# Patient Record
Sex: FEMALE | Race: WHITE | NOT HISPANIC OR LATINO | Employment: OTHER | URBAN - METROPOLITAN AREA
[De-identification: names, ages, dates, MRNs, and addresses within clinical notes are randomized per-mention and may not be internally consistent; named-entity substitution may affect disease eponyms.]

---

## 2017-01-05 DIAGNOSIS — E78.5 HYPERLIPIDEMIA, UNSPECIFIED HYPERLIPIDEMIA TYPE: ICD-10-CM

## 2017-01-05 RX ORDER — PRAVASTATIN SODIUM 40 MG/1
40 TABLET ORAL DAILY
Qty: 90 TABLET | Refills: 1 | Status: SHIPPED | OUTPATIENT
Start: 2017-01-05 | End: 2018-04-25 | Stop reason: DRUGHIGH

## 2017-01-05 NOTE — TELEPHONE ENCOUNTER
----- Message from Jaclyn Maya sent at 1/4/2017 10:41 AM CST -----  Contact: Joselo Mancia  REFILL: pravastatin (PRAVACHOL) 40 MG tablet

## 2017-01-16 RX ORDER — CARVEDILOL 12.5 MG/1
TABLET ORAL
Qty: 60 TABLET | Refills: 2 | Status: SHIPPED | OUTPATIENT
Start: 2017-01-16 | End: 2018-04-25 | Stop reason: SDUPTHER

## 2017-03-07 DIAGNOSIS — E55.9 HYPOVITAMINOSIS D: ICD-10-CM

## 2017-03-07 RX ORDER — ERGOCALCIFEROL 1.25 MG/1
CAPSULE ORAL
Qty: 12 CAPSULE | Refills: 1 | Status: SHIPPED | OUTPATIENT
Start: 2017-03-07 | End: 2017-08-16 | Stop reason: SDUPTHER

## 2017-04-11 ENCOUNTER — OFFICE VISIT (OUTPATIENT)
Dept: FAMILY MEDICINE | Facility: CLINIC | Age: 82
End: 2017-04-11
Payer: MEDICARE

## 2017-04-11 VITALS
OXYGEN SATURATION: 95 % | SYSTOLIC BLOOD PRESSURE: 130 MMHG | HEIGHT: 57 IN | HEART RATE: 69 BPM | BODY MASS INDEX: 33.57 KG/M2 | DIASTOLIC BLOOD PRESSURE: 72 MMHG | WEIGHT: 155.63 LBS

## 2017-04-11 DIAGNOSIS — E11.9 DIET-CONTROLLED DIABETES MELLITUS: ICD-10-CM

## 2017-04-11 DIAGNOSIS — I50.32 CHRONIC DIASTOLIC HEART FAILURE: ICD-10-CM

## 2017-04-11 DIAGNOSIS — I70.1 ATHEROSCLEROSIS OF RENAL ARTERY: Primary | ICD-10-CM

## 2017-04-11 DIAGNOSIS — N95.1 MENOPAUSAL STATE: ICD-10-CM

## 2017-04-11 DIAGNOSIS — E11.9 CONTROLLED TYPE 2 DIABETES MELLITUS WITHOUT COMPLICATION, WITHOUT LONG-TERM CURRENT USE OF INSULIN: ICD-10-CM

## 2017-04-11 PROCEDURE — 1159F MED LIST DOCD IN RCRD: CPT | Mod: S$GLB,,, | Performed by: FAMILY MEDICINE

## 2017-04-11 PROCEDURE — 99214 OFFICE O/P EST MOD 30 MIN: CPT | Mod: S$GLB,,, | Performed by: FAMILY MEDICINE

## 2017-04-11 PROCEDURE — 99499 UNLISTED E&M SERVICE: CPT | Mod: S$GLB,,, | Performed by: FAMILY MEDICINE

## 2017-04-11 PROCEDURE — 1157F ADVNC CARE PLAN IN RCRD: CPT | Mod: S$GLB,,, | Performed by: FAMILY MEDICINE

## 2017-04-11 PROCEDURE — 99999 PR PBB SHADOW E&M-EST. PATIENT-LVL III: CPT | Mod: PBBFAC,,, | Performed by: FAMILY MEDICINE

## 2017-04-11 PROCEDURE — 1160F RVW MEDS BY RX/DR IN RCRD: CPT | Mod: S$GLB,,, | Performed by: FAMILY MEDICINE

## 2017-04-11 RX ORDER — LANCETS
EACH MISCELLANEOUS
Qty: 30 EACH | Refills: 11 | Status: SHIPPED | OUTPATIENT
Start: 2017-04-11 | End: 2020-10-20 | Stop reason: SDUPTHER

## 2017-04-11 RX ORDER — DEXTROSE 4 G
TABLET,CHEWABLE ORAL
Qty: 1 EACH | Refills: 0 | Status: SHIPPED | OUTPATIENT
Start: 2017-04-11

## 2017-04-11 RX ORDER — EZETIMIBE 10 MG/1
10 TABLET ORAL NIGHTLY
Refills: 3 | COMMUNITY
Start: 2017-03-27

## 2017-04-11 RX ORDER — FUROSEMIDE 20 MG/1
20 TABLET ORAL
Refills: 3 | COMMUNITY
Start: 2017-03-27 | End: 2022-12-27 | Stop reason: SDUPTHER

## 2017-04-11 NOTE — PROGRESS NOTES
"Subjective:       Patient ID: Maritza Rivas is a 89 y.o. female.    Chief Complaint: Follow Up/ Hypertension    Hypertension   This is a chronic problem. The current episode started more than 1 year ago. Associated symptoms include shortness of breath. Pertinent negatives include no chest pain, headaches or palpitations. Past treatments include calcium channel blockers, beta blockers and angiotensin blockers (130's/70's). The current treatment provides moderate improvement.   Diabetes   She presents for her follow-up diabetic visit. She has type 2 diabetes mellitus. Pertinent negatives for hypoglycemia include no headaches. Pertinent negatives for diabetes include no chest pain. An ACE inhibitor/angiotensin II receptor blocker is being taken. Eye exam current: 24th of april dr. river.     Review of Systems   Respiratory: Positive for shortness of breath. Negative for chest tightness and wheezing.         SOB with walking for long periods of time   Cardiovascular: Positive for leg swelling. Negative for chest pain and palpitations.        Swelling with ham   Neurological: Negative for syncope, light-headedness and headaches.       Objective:       Vitals:    04/11/17 1450   BP: 130/72   Pulse: 69   SpO2: 95%   Weight: 70.6 kg (155 lb 10.3 oz)   Height: 4' 8.5" (1.435 m)       Physical Exam   Constitutional: She is oriented to person, place, and time. She appears well-developed and well-nourished. No distress.   HENT:   Head: Normocephalic and atraumatic.   Eyes: Conjunctivae are normal.   Neck: Normal range of motion. Neck supple. Carotid bruit is not present.   Cardiovascular: Normal rate, regular rhythm and normal heart sounds.  Exam reveals no gallop and no friction rub.    No murmur heard.  Pulmonary/Chest: Effort normal and breath sounds normal. No respiratory distress. She has no wheezes. She has no rales.   Musculoskeletal: She exhibits no edema.   Neurological: She is alert and oriented to person, " place, and time.   Skin: She is not diaphoretic.       Assessment:       1. Atherosclerosis of renal artery    2. Controlled type 2 diabetes mellitus without complication, without long-term current use of insulin    3. Chronic diastolic heart failure    4. Menopausal state    5. Diet-controlled diabetes mellitus        Plan:       Maritza was seen today for follow up/ hypertension.    Diagnoses and all orders for this visit:    Atherosclerosis of renal artery  -     Comprehensive metabolic panel; Future    Controlled type 2 diabetes mellitus without complication, without long-term current use of insulin.   -     Comprehensive metabolic panel; Future  -     Hemoglobin A1c; Future  -     Lipid panel; Future  -     Microalbumin/creatinine urine ratio; Future  -     TSH; Future  -     CBC auto differential; Future  Ordered labs  Return in about 6 months (around 10/11/2017).    Chronic diastolic heart failure  -     Comprehensive metabolic panel; Future  -     Lipid panel; Future  -     TSH; Future  -     CBC auto differential; Future    Menopausal state  -     Vitamin D; Future  -     DXA Bone Density Spine And Hip; Future  Due for bone density.    Diet-controlled diabetes mellitus  -     blood-glucose meter (TRUE METRIX GLUCOSE METER) Misc; Use as directed  -     blood sugar diagnostic (TRUE METRIX GLUCOSE TEST STRIP) Strp; Check blood sugar DAILY  -     lancets Misc; True Metrix Lancets. CHECK SUGAR DAILY  Will send supplies to N.

## 2017-04-12 ENCOUNTER — LAB VISIT (OUTPATIENT)
Dept: LAB | Facility: HOSPITAL | Age: 82
End: 2017-04-12
Attending: FAMILY MEDICINE
Payer: MEDICARE

## 2017-04-12 DIAGNOSIS — I70.1 ATHEROSCLEROSIS OF RENAL ARTERY: ICD-10-CM

## 2017-04-12 DIAGNOSIS — I50.32 CHRONIC DIASTOLIC HEART FAILURE: ICD-10-CM

## 2017-04-12 DIAGNOSIS — N95.1 MENOPAUSAL STATE: ICD-10-CM

## 2017-04-12 DIAGNOSIS — E11.9 CONTROLLED TYPE 2 DIABETES MELLITUS WITHOUT COMPLICATION, WITHOUT LONG-TERM CURRENT USE OF INSULIN: ICD-10-CM

## 2017-04-12 LAB
ALBUMIN SERPL BCP-MCNC: 4 G/DL
ALP SERPL-CCNC: 78 U/L
ALT SERPL W/O P-5'-P-CCNC: 27 U/L
ANION GAP SERPL CALC-SCNC: 10 MMOL/L
AST SERPL-CCNC: 38 U/L
BASOPHILS # BLD AUTO: 0.05 K/UL
BASOPHILS NFR BLD: 0.9 %
BILIRUB SERPL-MCNC: 1.1 MG/DL
BUN SERPL-MCNC: 33 MG/DL
CALCIUM SERPL-MCNC: 10 MG/DL
CHLORIDE SERPL-SCNC: 106 MMOL/L
CHOLEST/HDLC SERPL: 3.4 {RATIO}
CO2 SERPL-SCNC: 23 MMOL/L
CREAT SERPL-MCNC: 1.2 MG/DL
DIFFERENTIAL METHOD: NORMAL
EOSINOPHIL # BLD AUTO: 0.3 K/UL
EOSINOPHIL NFR BLD: 4.5 %
ERYTHROCYTE [DISTWIDTH] IN BLOOD BY AUTOMATED COUNT: 13.5 %
EST. GFR  (AFRICAN AMERICAN): 46 ML/MIN/1.73 M^2
EST. GFR  (NON AFRICAN AMERICAN): 40 ML/MIN/1.73 M^2
GLUCOSE SERPL-MCNC: 98 MG/DL
HCT VFR BLD AUTO: 38.9 %
HDL/CHOLESTEROL RATIO: 29.6 %
HDLC SERPL-MCNC: 142 MG/DL
HDLC SERPL-MCNC: 42 MG/DL
HGB BLD-MCNC: 13.1 G/DL
LDLC SERPL CALC-MCNC: 67.4 MG/DL
LYMPHOCYTES # BLD AUTO: 1.7 K/UL
LYMPHOCYTES NFR BLD: 28.8 %
MCH RBC QN AUTO: 28.1 PG
MCHC RBC AUTO-ENTMCNC: 33.7 %
MCV RBC AUTO: 84 FL
MONOCYTES # BLD AUTO: 0.6 K/UL
MONOCYTES NFR BLD: 9.9 %
NEUTROPHILS # BLD AUTO: 3.2 K/UL
NEUTROPHILS NFR BLD: 55.7 %
NONHDLC SERPL-MCNC: 100 MG/DL
PLATELET # BLD AUTO: 179 K/UL
PMV BLD AUTO: 12.1 FL
POTASSIUM SERPL-SCNC: 4.9 MMOL/L
PROT SERPL-MCNC: 7.4 G/DL
RBC # BLD AUTO: 4.66 M/UL
SODIUM SERPL-SCNC: 139 MMOL/L
TRIGL SERPL-MCNC: 163 MG/DL
TSH SERPL DL<=0.005 MIU/L-ACNC: 1.85 UIU/ML
WBC # BLD AUTO: 5.77 K/UL

## 2017-04-12 PROCEDURE — 82306 VITAMIN D 25 HYDROXY: CPT

## 2017-04-12 PROCEDURE — 80053 COMPREHEN METABOLIC PANEL: CPT

## 2017-04-12 PROCEDURE — 84443 ASSAY THYROID STIM HORMONE: CPT

## 2017-04-12 PROCEDURE — 80061 LIPID PANEL: CPT

## 2017-04-12 PROCEDURE — 36415 COLL VENOUS BLD VENIPUNCTURE: CPT

## 2017-04-12 PROCEDURE — 83036 HEMOGLOBIN GLYCOSYLATED A1C: CPT

## 2017-04-12 PROCEDURE — 85025 COMPLETE CBC W/AUTO DIFF WBC: CPT

## 2017-04-13 ENCOUNTER — LAB VISIT (OUTPATIENT)
Dept: LAB | Facility: HOSPITAL | Age: 82
End: 2017-04-13
Attending: FAMILY MEDICINE
Payer: MEDICARE

## 2017-04-13 DIAGNOSIS — E11.9 TYPE 2 DIABETES MELLITUS WITHOUT COMPLICATION: ICD-10-CM

## 2017-04-13 LAB
25(OH)D3+25(OH)D2 SERPL-MCNC: 29 NG/ML
CREAT UR-MCNC: 142 MG/DL
ESTIMATED AVG GLUCOSE: 128 MG/DL
HBA1C MFR BLD HPLC: 6.1 %
MICROALBUMIN UR DL<=1MG/L-MCNC: 21 UG/ML
MICROALBUMIN/CREATININE RATIO: 14.8 UG/MG

## 2017-04-13 PROCEDURE — 82570 ASSAY OF URINE CREATININE: CPT

## 2017-04-20 ENCOUNTER — HOSPITAL ENCOUNTER (OUTPATIENT)
Dept: RADIOLOGY | Facility: HOSPITAL | Age: 82
Discharge: HOME | End: 2017-04-20
Attending: FAMILY MEDICINE
Payer: MEDICARE

## 2017-04-20 DIAGNOSIS — N95.1 MENOPAUSAL STATE: ICD-10-CM

## 2017-04-20 PROCEDURE — 77080 DXA BONE DENSITY AXIAL: CPT | Mod: 26,,, | Performed by: RADIOLOGY

## 2017-04-20 PROCEDURE — 77080 DXA BONE DENSITY AXIAL: CPT | Mod: TC

## 2017-04-27 ENCOUNTER — TELEPHONE (OUTPATIENT)
Dept: FAMILY MEDICINE | Facility: CLINIC | Age: 82
End: 2017-04-27

## 2017-05-01 ENCOUNTER — TELEPHONE (OUTPATIENT)
Dept: FAMILY MEDICINE | Facility: CLINIC | Age: 82
End: 2017-05-01

## 2017-05-01 ENCOUNTER — TELEPHONE (OUTPATIENT)
Dept: PHARMACY | Facility: CLINIC | Age: 82
End: 2017-05-01

## 2017-05-03 ENCOUNTER — TELEPHONE (OUTPATIENT)
Dept: FAMILY MEDICINE | Facility: CLINIC | Age: 82
End: 2017-05-03

## 2017-05-03 NOTE — TELEPHONE ENCOUNTER
----- Message from Teresa Gillette sent at 5/2/2017  2:50 PM CDT -----  Contact:  pharmacy [facility] is requesting a call from office in regards to pt [phone]            Thanks

## 2017-05-15 ENCOUNTER — OFFICE VISIT (OUTPATIENT)
Dept: FAMILY MEDICINE | Facility: CLINIC | Age: 82
End: 2017-05-15
Payer: MEDICARE

## 2017-05-15 VITALS
SYSTOLIC BLOOD PRESSURE: 120 MMHG | TEMPERATURE: 100 F | HEART RATE: 76 BPM | HEIGHT: 57 IN | BODY MASS INDEX: 33.82 KG/M2 | WEIGHT: 156.75 LBS | OXYGEN SATURATION: 90 % | DIASTOLIC BLOOD PRESSURE: 58 MMHG

## 2017-05-15 DIAGNOSIS — J20.9 ACUTE BRONCHITIS, UNSPECIFIED ORGANISM: Primary | ICD-10-CM

## 2017-05-15 PROCEDURE — 99213 OFFICE O/P EST LOW 20 MIN: CPT | Mod: S$GLB,,, | Performed by: INTERNAL MEDICINE

## 2017-05-15 PROCEDURE — 99499 UNLISTED E&M SERVICE: CPT | Mod: S$GLB,,, | Performed by: INTERNAL MEDICINE

## 2017-05-15 PROCEDURE — 1160F RVW MEDS BY RX/DR IN RCRD: CPT | Mod: S$GLB,,, | Performed by: INTERNAL MEDICINE

## 2017-05-15 PROCEDURE — 99999 PR PBB SHADOW E&M-EST. PATIENT-LVL III: CPT | Mod: PBBFAC,,, | Performed by: INTERNAL MEDICINE

## 2017-05-15 PROCEDURE — 1159F MED LIST DOCD IN RCRD: CPT | Mod: S$GLB,,, | Performed by: INTERNAL MEDICINE

## 2017-05-15 PROCEDURE — 1125F AMNT PAIN NOTED PAIN PRSNT: CPT | Mod: S$GLB,,, | Performed by: INTERNAL MEDICINE

## 2017-05-15 NOTE — MR AVS SNAPSHOT
[facility]  [address]  [address]  [place] [address]  Phone: [phone]  Fax: [phone]                  Maritza Rivas   5/15/2017 1:40 PM   Office Visit    Description:  Female : 1926   Provider:  Laura Farnsworth MD   Department:  [facility]           Reason for Visit     Chest Congestion     Cough     Fever     Shortness of Breath           Diagnoses this Visit        Comments    Acute bronchitis, unspecified organism    -  Primary            To Do List           Goals (5 Years of Data)     None      [facility]     [facility] On Call Nurse Care Line -  Assistance  Unless otherwise directed by your provider, please contact [facility], our nurse care line that is available for  assistance.     Registered nurses in the [facility] On Call Center provide: appointment scheduling, clinical advisement, health education, and other advisory services.  Call: [phone] (toll free)               Medications           Message regarding Medications     Verify the changes and/or additions to your medication regime listed below are the same as discussed with your clinician today.  If any of these changes or additions are incorrect, please notify your healthcare provider.             Verify that the below list of medications is an accurate representation of the medications you are currently taking.  If none reported, the list may be blank. If incorrect, please contact your healthcare provider. Carry this list with you in case of emergency.           Current Medications     amlodipine (NORVASC) 10 MG tablet TAKE 1 TABLET (10 MG TOTAL) BY MOUTH ONCE DAILY.    aspirin (ECOTRIN) 81 MG EC tablet Take 81 mg by mouth nightly.     blood sugar diagnostic (TRUE METRIX GLUCOSE TEST STRIP) Strp Check blood sugar DAILY    blood-glucose meter (TRUE METRIX GLUCOSE METER) Misc Use as directed    carvedilol (COREG) 12.5 MG tablet TAKE 1 TABLET (12.5 MG TOTAL) BY  "MOUTH 2 (TWO) TIMES DAILY WITH MEALS.    denosumab (PROLIA) 60 mg/mL Syrg Inject 1 mL (60 mg total) into the skin every 6 (six) months.    docusate sodium (COLACE) 50 MG capsule Take by mouth every evening.     ergocalciferol (VITAMIN D2) 50,000 unit Cap Take 50,000 Units by mouth every 7 days.    ezetimibe (ZETIA) 10 mg tablet Take 10 mg by mouth every evening.    folic acid-vit B6-vit B12 2.5-25-2 mg (FOLBIC) 2.5-25-2 mg Tab Take 1 tablet by mouth once daily.    furosemide (LASIX) 20 MG tablet Take 20 mg by mouth twice a week.    lancets Misc True Metrix Lancets. CHECK SUGAR DAILY    levothyroxine (SYNTHROID) 75 MCG tablet TAKE 1 TABLET BY MOUTH BEFORE BREAKFAST.    pravastatin (PRAVACHOL) 40 MG tablet Take 1 tablet (40 mg total) by mouth once daily.    valsartan (DIOVAN) 40 MG tablet Take 1 tablet (40 mg total) by mouth once daily.    VITAMIN D2 50,000 unit capsule TAKE 1 CAPSULE BY MOUTH ONCE A WEEK           Clinical Reference Information           Your Vitals Were     BP Pulse Temp Height Weight SpO2    120/58 76 99.6 °F (37.6 °C) (Oral) 4' 8.5" (1.435 m) 71.1 kg (156 lb 12 oz) 90%    BMI                34.52 kg/m2          Blood Pressure          Most Recent Value    BP  (!)  120/58      Allergies as of 5/15/2017     Simvastatin    Phenergan [Promethazine]      Immunizations Administered on Date of Encounter - 5/15/2017     None      [facility] Sign-Up     Activating your [facility] account is as easy as 1-2-3!     1) Visit [email or web address], select Sign Up Now, enter this activation code and your date of birth, then select Next.  [email or web address]  Expires: 6/29/2017  2:28 PM      2) Create a username and password to use when you visit [facility] in the future and select a security question in case you lose your password and select Next.    3) Enter your e-mail address and click Sign Up!    Additional Information  If you have questions, please e-mail [email or web address] or call [phone] to talk to our " [place] staff. Remember, [facility] is NOT to be used for urgent needs. For medical emergencies, dial 911.         Instructions      What Is Acute Bronchitis?  Acute or short-term bronchitis last for days or weeks. It occurs when the bronchial tubes (airways in the lungs) are irritated by a virus, bacteria, or allergen. This causes a cough that produces yellow or greenish mucus.  Inside healthy lungs    Air travels in and out of the lungs through the airways. The linings of these airways produce sticky mucus. This mucus traps particles that enter the lungs. Tiny structures called cilia then sweep the particles out of the airways.     Healthy airway: Airways are normally open. Air moves in and out easily.      Healthy cilia: Tiny, hairlike cilia sweep mucus and particles up and out of the airways.   Lungs with bronchitis  Bronchitis often occurs with a cold or the flu virus. The airways become inflamed (red and swollen). There is a deep hacking cough from the extra mucus. Other symptoms may include:  · Wheezing  · Chest discomfort  · Shortness of breath  · Mild fever  A second infection, this time due to bacteria, may then occur. And airways irritated by allergens or smoke are more likely to get infected.        Inflamed airway: Inflammation and extra mucus narrow the airway, causing shortness of breath.      Impaired cilia: Extra mucus impairs cilia, causing congestion and wheezing. Smoking makes the problem worse.   Making a diagnosis  A physical exam, health history, and certain tests help your healthcare provider make the diagnosis.  Health history  Your healthcare provider will ask you about your symptoms.  The exam  Your provider listens to your chest for signs of congestion. He or she may also check your ears, nose, and throat.  Possible tests  · A sputum test for bacteria. This requires a sample of mucus from the lungs.  · A nasal or throat swab for bacterial infection.  · A chest X-ray if your healthcare  provider thinks you have pneumonia.  · Tests to check for an underlying condition, such as allergies, asthma, or COPD. You may need to see a specialist for more lung function testing.  Treating a cough  The main treatment for bronchitis is easing symptoms. Avoiding smoke, allergens, and other things that trigger coughing can often help. If the infection is bacterial, you may be given antibiotics. During the illness, it's important to get plenty of sleep. To ease symptoms:  · Dont smoke, and avoid secondhand smoke.  · Use a humidifier, or breathe in steam from a hot shower. This may help loosen mucus.  · Drink a lot of water and juice. They can soothe the throat and may help thin mucus.  · Sit up or use extra pillows when in bed to help lessen coughing and congestion.  · Ask your provider about using cough medicine, pain and fever medicine, or a decongestant.  Antibiotics  Most cases of bronchitis are caused by cold or flu viruses. Antibiotics dont treat viral illness. Taking antibiotics when they are not needed increases your risk of getting an infection later that is antibiotic-resistant. Your provider will prescribe antibiotics if the infection is caused by bacteria. If they are prescribed:  · Take the medicine until it is used up, even if symptoms have improved. If you dont, the bronchitis may come back.  · Take them as directed. For instance, some medicines should be taken with food.  · Ask your provider or pharmacist what side effects are common, and what to do about them.  Follow-up care  You should see your provider again in 2 to 3 weeks. By this time, symptoms should have improved. An infection that lasts longer may mean you have a more serious problem.  Prevention  · Avoid tobacco smoke. If you smoke, quit. Stay away from smoky places. Ask friends and family not to smoke around you, or in your home or car.  · Get checked for allergies.  · Ask your provider about getting a yearly flu shot, and  pneumococcal or pneumonia shots.  · Wash your hands often. This helps reduce the chance of picking up viruses that cause colds and flu.  Call your healthcare provider if:  · Symptoms worsen, or new symptoms develop.  · Breathing problems worsen or  become severe.  · Symptoms dont get better within a week, or within 3 days of taking antibiotics.   Date Last Reviewed: 6/18/2014  © 6424-1089 [employer]. [address] [place] [address], [place] [address]. All rights reserved. This information is not intended as a substitute for professional medical care. Always follow your healthcare professional's instructions.             Language Assistance Services     ATTENTION: Language assistance services are available, free of charge. Please call [phone].      ATENCIÓN: Si anla pravin, tiene a pimentel disposición servicios gratuitos de asistencia lingüística. Llame al [phone].     CHÚ Ý: N?u b?n nói Ti?ng Vi?t, có các d?ch v? h? tr? ngôn ng? mi?n phí dành cho b?n. G?i s? [phone].         [facility] complies with applicable Federal civil rights laws and does not discriminate on the basis of race, color, national origin, age, disability, or sex.

## 2017-05-15 NOTE — PATIENT INSTRUCTIONS
What Is Acute Bronchitis?  Acute or short-term bronchitis last for days or weeks. It occurs when the bronchial tubes (airways in the lungs) are irritated by a virus, bacteria, or allergen. This causes a cough that produces yellow or greenish mucus.  Inside healthy lungs    Air travels in and out of the lungs through the airways. The linings of these airways produce sticky mucus. This mucus traps particles that enter the lungs. Tiny structures called cilia then sweep the particles out of the airways.     Healthy airway: Airways are normally open. Air moves in and out easily.      Healthy cilia: Tiny, hairlike cilia sweep mucus and particles up and out of the airways.   Lungs with bronchitis  Bronchitis often occurs with a cold or the flu virus. The airways become inflamed (red and swollen). There is a deep hacking cough from the extra mucus. Other symptoms may include:  · Wheezing  · Chest discomfort  · Shortness of breath  · Mild fever  A second infection, this time due to bacteria, may then occur. And airways irritated by allergens or smoke are more likely to get infected.        Inflamed airway: Inflammation and extra mucus narrow the airway, causing shortness of breath.      Impaired cilia: Extra mucus impairs cilia, causing congestion and wheezing. Smoking makes the problem worse.   Making a diagnosis  A physical exam, health history, and certain tests help your healthcare provider make the diagnosis.  Health history  Your healthcare provider will ask you about your symptoms.  The exam  Your provider listens to your chest for signs of congestion. He or she may also check your ears, nose, and throat.  Possible tests  · A sputum test for bacteria. This requires a sample of mucus from the lungs.  · A nasal or throat swab for bacterial infection.  · A chest X-ray if your healthcare provider thinks you have pneumonia.  · Tests to check for an underlying condition, such as allergies, asthma, or COPD. You may need  to see a specialist for more lung function testing.  Treating a cough  The main treatment for bronchitis is easing symptoms. Avoiding smoke, allergens, and other things that trigger coughing can often help. If the infection is bacterial, you may be given antibiotics. During the illness, it's important to get plenty of sleep. To ease symptoms:  · Dont smoke, and avoid secondhand smoke.  · Use a humidifier, or breathe in steam from a hot shower. This may help loosen mucus.  · Drink a lot of water and juice. They can soothe the throat and may help thin mucus.  · Sit up or use extra pillows when in bed to help lessen coughing and congestion.  · Ask your provider about using cough medicine, pain and fever medicine, or a decongestant.  Antibiotics  Most cases of bronchitis are caused by cold or flu viruses. Antibiotics dont treat viral illness. Taking antibiotics when they are not needed increases your risk of getting an infection later that is antibiotic-resistant. Your provider will prescribe antibiotics if the infection is caused by bacteria. If they are prescribed:  · Take the medicine until it is used up, even if symptoms have improved. If you dont, the bronchitis may come back.  · Take them as directed. For instance, some medicines should be taken with food.  · Ask your provider or pharmacist what side effects are common, and what to do about them.  Follow-up care  You should see your provider again in 2 to 3 weeks. By this time, symptoms should have improved. An infection that lasts longer may mean you have a more serious problem.  Prevention  · Avoid tobacco smoke. If you smoke, quit. Stay away from smoky places. Ask friends and family not to smoke around you, or in your home or car.  · Get checked for allergies.  · Ask your provider about getting a yearly flu shot, and pneumococcal or pneumonia shots.  · Wash your hands often. This helps reduce the chance of picking up viruses that cause colds and flu.  Call  your healthcare provider if:  · Symptoms worsen, or new symptoms develop.  · Breathing problems worsen or  become severe.  · Symptoms dont get better within a week, or within 3 days of taking antibiotics.   Date Last Reviewed: 6/18/2014  © 2512-0810 [employer]. [address] [place] [address], [place] [address]. All rights reserved. This information is not intended as a substitute for professional medical care. Always follow your healthcare professional's instructions.

## 2017-05-15 NOTE — PROGRESS NOTES
SUBJECTIVE     Chief Complaint   Patient presents with    Chest Congestion     all sx x 6 days. OTC: Tylenol    Cough    Fever    Shortness of Breath       HPI  Maritza Rivas is a 89 y.o. female with multiple medical diagnoses as listed in the medical history and problem list that presents for evaluation of URI x 6 days. Pt complaints of a productive cough, fever up to 102.4 last night(at ~6pm), and SOB. Pt took Tylenol with resolution of fever. +sick contacts(great-granddaughter and granddaughter with URI). Denies any recent travel.    PAST MEDICAL HISTORY:  Past Medical History:   Diagnosis Date    Aortic stenosis     Aortic valve stenosis, critical     Carotid bruit     Carpal tunnel syndrome, bilateral     Coronary artery disease     Diabetes mellitus type II     H/O aortic valve replacement     Hypertension     Kidney insufficiency     PCP stated Stage 3    Pure hyperglyceridemia     Thyroid disease     hypothyroidism       PAST SURGICAL HISTORY:  Past Surgical History:   Procedure Laterality Date    ADENOIDECTOMY      AORTIC VALVE REPLACEMENT  2013    CARDIAC SURGERY  2013    3 VESSEL bypass    CATARACT EXTRACTION BILATERAL W/ ANTERIOR VITRECTOMY      TONSILLECTOMY  1946    VAGINAL DELIVERY      x9 normal       SOCIAL HISTORY:  Social History     Social History    Marital status:      Spouse name: N/A    Number of children: N/A    Years of education: N/A     Occupational History    Not on file.     Social History Main Topics    Smoking status: Never Smoker    Smokeless tobacco: Not on file    Alcohol use No    Drug use: No    Sexual activity: No     Other Topics Concern    Not on file     Social History Narrative       FAMILY HISTORY:  Family History   Problem Relation Age of Onset    Stroke Mother     Diabetes Mother     Heart disease Father        ALLERGIES AND MEDICATIONS: updated and reviewed.  Review of patient's allergies indicates:   Allergen Reactions     Simvastatin      Leg cramps    Phenergan [promethazine] Other (See Comments)     confusion     Current Outpatient Prescriptions   Medication Sig Dispense Refill    amlodipine (NORVASC) 10 MG tablet TAKE 1 TABLET (10 MG TOTAL) BY MOUTH ONCE DAILY. 30 tablet 5    aspirin (ECOTRIN) 81 MG EC tablet Take 81 mg by mouth nightly.       blood sugar diagnostic (TRUE METRIX GLUCOSE TEST STRIP) Strp Check blood sugar DAILY 30 each 11    blood-glucose meter (TRUE METRIX GLUCOSE METER) Misc Use as directed 1 each 0    carvedilol (COREG) 12.5 MG tablet TAKE 1 TABLET (12.5 MG TOTAL) BY MOUTH 2 (TWO) TIMES DAILY WITH MEALS. 60 tablet 2    denosumab (PROLIA) 60 mg/mL Syrg Inject 1 mL (60 mg total) into the skin every 6 (six) months. 1 mL 1    docusate sodium (COLACE) 50 MG capsule Take by mouth every evening.       ergocalciferol (VITAMIN D2) 50,000 unit Cap Take 50,000 Units by mouth every 7 days.      ezetimibe (ZETIA) 10 mg tablet Take 10 mg by mouth every evening.  3    folic acid-vit B6-vit B12 2.5-25-2 mg (FOLBIC) 2.5-25-2 mg Tab Take 1 tablet by mouth once daily. 30 tablet 11    furosemide (LASIX) 20 MG tablet Take 20 mg by mouth twice a week.  3    lancets Misc True Metrix Lancets. CHECK SUGAR DAILY 30 each 11    levothyroxine (SYNTHROID) 75 MCG tablet TAKE 1 TABLET BY MOUTH BEFORE BREAKFAST. 30 tablet 9    pravastatin (PRAVACHOL) 40 MG tablet Take 1 tablet (40 mg total) by mouth once daily. 90 tablet 1    valsartan (DIOVAN) 40 MG tablet Take 1 tablet (40 mg total) by mouth once daily. 90 tablet 1    VITAMIN D2 50,000 unit capsule TAKE 1 CAPSULE BY MOUTH ONCE A WEEK 12 capsule 1    [DISCONTINUED] metoprolol succinate (TOPROL XL) 50 MG 24 hr tablet Take 1 tablet (50 mg total) by mouth once daily. 30 tablet 11     No current facility-administered medications for this visit.        ROS  Review of Systems   Constitutional: Positive for fever. Negative for chills.   HENT: Negative for hearing loss and sore  "throat.    Eyes: Negative for visual disturbance.   Respiratory: Positive for cough and shortness of breath.    Cardiovascular: Negative for chest pain, palpitations and leg swelling.   Gastrointestinal: Positive for constipation. Negative for abdominal pain, diarrhea, nausea and vomiting.   Genitourinary: Negative for dysuria, frequency and urgency.   Musculoskeletal: Negative for arthralgias, joint swelling and myalgias.   Skin: Positive for rash (pelvic area). Negative for wound.   Neurological: Negative for headaches.   Psychiatric/Behavioral: Negative for agitation and confusion. The patient is not nervous/anxious.          OBJECTIVE     Physical Exam  Vitals:    05/15/17 1349   BP: (!) 120/58   Pulse: 76   Temp: 99.6 °F (37.6 °C)    Body mass index is 34.52 kg/(m^2).  Weight: 71.1 kg (156 lb 12 oz)   Height: 4' 8.5" (143.5 cm)     Physical Exam   Constitutional: She is oriented to person, place, and time. She appears well-developed and well-nourished. No distress.   HENT:   Head: Normocephalic and atraumatic.   Right Ear: External ear normal.   Left Ear: External ear normal.   Nose: Nose normal.   Mouth/Throat: Oropharynx is clear and moist.   Eyes: Conjunctivae and EOM are normal. Right eye exhibits no discharge. Left eye exhibits no discharge. No scleral icterus.   Neck: Normal range of motion. Neck supple. No JVD present. No tracheal deviation present.   Cardiovascular: Normal rate, regular rhythm and intact distal pulses.  Exam reveals no gallop and no friction rub.    Murmur (holosystolic with radiation to the B/L clavicles) heard.  Pulmonary/Chest: Effort normal and breath sounds normal. No respiratory distress. She has no wheezes.   Abdominal: Soft. Bowel sounds are normal. She exhibits no distension and no mass. There is no tenderness. There is no rebound and no guarding.   Musculoskeletal: Normal range of motion. She exhibits no edema, tenderness or deformity.   Neurological: She is alert and oriented " to person, place, and time. She exhibits normal muscle tone. Coordination normal.   Skin: Skin is warm and dry. No rash noted. No erythema.   Psychiatric: She has a normal mood and affect. Her behavior is normal. Judgment and thought content normal.         Health Maintenance       Date Due Completion Date    Zoster Vaccine 8/1/1986 ---    Eye Exam 4/2/2016 4/2/2015 (Done)    Override on 4/2/2015: Done    Override on 4/22/2014: Done (appointment scheduled)    Influenza Vaccine 8/1/2017 10/25/2016    Override on 10/14/2015: Done    Foot Exam 9/6/2017 9/6/2016 (Done)    Override on 9/6/2016: Done    Override on 10/9/2015: Done (Dr. Richey [facility])    Hemoglobin A1c 10/12/2017 4/12/2017    Pneumococcal (65+) (2 of 2 - PPSV23) 10/24/2017 10/24/2016    Lipid Panel 4/12/2018 4/12/2017    Urine Microalbumin 4/13/2018 4/13/2017    DEXA SCAN 4/20/2020 4/20/2017    TETANUS VACCINE 6/19/2024 6/19/2014            ASSESSMENT     89 y.o. female with     1. Acute bronchitis, unspecified organism        PLAN:     1. Acute bronchitis, unspecified organism  - Likely 2/2 an acute viral illness  - Pt advised to rest and remain well hydrated  - Pt to take OTC cough meds(Delsym) prn cough  - Pt refused a Rx for Tessalon Perles and told me that she will not take any Guaifenesin        RTC in 1-2 weeks for any acute worsening of current condition     Laura Farnsworth MD  05/15/2017 2:15 PM        No Follow-up on file.

## 2017-05-25 ENCOUNTER — OFFICE VISIT (OUTPATIENT)
Dept: FAMILY MEDICINE | Facility: CLINIC | Age: 82
End: 2017-05-25
Payer: MEDICARE

## 2017-05-25 VITALS
SYSTOLIC BLOOD PRESSURE: 130 MMHG | WEIGHT: 151.25 LBS | TEMPERATURE: 98 F | HEART RATE: 70 BPM | OXYGEN SATURATION: 94 % | DIASTOLIC BLOOD PRESSURE: 64 MMHG | HEIGHT: 57 IN | BODY MASS INDEX: 32.63 KG/M2

## 2017-05-25 DIAGNOSIS — E55.9 VITAMIN D DEFICIENCY: ICD-10-CM

## 2017-05-25 DIAGNOSIS — E11.22 CONTROLLED TYPE 2 DIABETES MELLITUS WITH STAGE 3 CHRONIC KIDNEY DISEASE, WITHOUT LONG-TERM CURRENT USE OF INSULIN: Primary | ICD-10-CM

## 2017-05-25 DIAGNOSIS — N18.30 CKD (CHRONIC KIDNEY DISEASE) STAGE 3, GFR 30-59 ML/MIN: ICD-10-CM

## 2017-05-25 DIAGNOSIS — N18.30 CONTROLLED TYPE 2 DIABETES MELLITUS WITH STAGE 3 CHRONIC KIDNEY DISEASE, WITHOUT LONG-TERM CURRENT USE OF INSULIN: Primary | ICD-10-CM

## 2017-05-25 DIAGNOSIS — J18.9 ATYPICAL PNEUMONIA: ICD-10-CM

## 2017-05-25 PROCEDURE — 99499 UNLISTED E&M SERVICE: CPT | Mod: S$GLB,,, | Performed by: FAMILY MEDICINE

## 2017-05-25 PROCEDURE — 99999 PR PBB SHADOW E&M-EST. PATIENT-LVL III: CPT | Mod: PBBFAC,,, | Performed by: FAMILY MEDICINE

## 2017-05-25 PROCEDURE — 1159F MED LIST DOCD IN RCRD: CPT | Mod: S$GLB,,, | Performed by: FAMILY MEDICINE

## 2017-05-25 PROCEDURE — 99214 OFFICE O/P EST MOD 30 MIN: CPT | Mod: S$GLB,,, | Performed by: FAMILY MEDICINE

## 2017-05-25 RX ORDER — BENZONATATE 100 MG/1
100 CAPSULE ORAL 3 TIMES DAILY PRN
Qty: 30 CAPSULE | Refills: 0 | Status: SHIPPED | OUTPATIENT
Start: 2017-05-25 | End: 2017-06-04

## 2017-05-25 RX ORDER — CEPHALEXIN 500 MG/1
500 CAPSULE ORAL EVERY 12 HOURS
Qty: 14 CAPSULE | Refills: 0 | Status: SHIPPED | OUTPATIENT
Start: 2017-05-25 | End: 2017-06-01

## 2017-05-25 NOTE — PROGRESS NOTES
"Subjective:       Patient ID: Maritza Rivas is a 89 y.o. female.    Chief Complaint: No chief complaint on file.    Patient with diabetes, hypertension, CAD, hypothyroidism presents with cough x 2 weeks. She states it was productive, but it has improved. She initially had fever, but this has resolved. She also gets a little short of breath as well.     She had her eyes checked in April with Dr. Rainey for her diabetes check up.       Review of Systems   Constitutional: Positive for fever. Negative for chills, diaphoresis and fatigue.   HENT: Positive for sore throat. Negative for postnasal drip, rhinorrhea and sneezing.    Respiratory: Positive for cough and shortness of breath. Negative for chest tightness and wheezing.    Cardiovascular: Negative for chest pain, palpitations and leg swelling.   Gastrointestinal: Negative for abdominal pain, diarrhea, nausea and vomiting.   Genitourinary: Negative for dysuria and hematuria.   Neurological: Negative for dizziness, syncope, light-headedness and headaches.       Objective:       Vitals:    05/25/17 1149   BP: 130/64   Pulse: 70   Temp: 98.4 °F (36.9 °C)   TempSrc: Oral   SpO2: (!) 94%   Weight: 68.6 kg (151 lb 3.8 oz)   Height: 4' 8.5" (1.435 m)     Physical Exam   Constitutional: She is oriented to person, place, and time. She appears well-developed and well-nourished. No distress.   HENT:   Head: Normocephalic and atraumatic.   Right Ear: External ear normal.   Left Ear: External ear normal.   Mouth/Throat: Oropharynx is clear and moist. No oropharyngeal exudate.   Eyes: Conjunctivae are normal.   Neck: Normal range of motion. Neck supple. Carotid bruit is not present.   Cardiovascular: Normal rate, regular rhythm and normal heart sounds.  Exam reveals no gallop and no friction rub.    No murmur heard.  Pulmonary/Chest: Effort normal. No respiratory distress. She has no wheezes. She has rales.   Musculoskeletal: She exhibits no edema.   Neurological: She is " alert and oriented to person, place, and time.   Skin: She is not diaphoretic.       Assessment:       1. Controlled type 2 diabetes mellitus with stage 3 chronic kidney disease, without long-term current use of insulin    2. Vitamin D deficiency    3. Atypical pneumonia    4. CKD (chronic kidney disease) stage 3, GFR 30-59 ml/min        Plan:       Diagnoses and all orders for this visit:    Controlled type 2 diabetes mellitus with stage 3 chronic kidney disease, without long-term current use of insulin  Her diabetes is controlled. A1C in April was 6.1. Due for lab recheck in October 2017. Will recheck kidney functioning with vitamin D in July 2017.     Vitamin D deficiency  On replacement. Will recheck vitamin D in July 2017.   Atypical pneumonia  -     cephALEXin (KEFLEX) 500 MG capsule; Take 1 capsule (500 mg total) by mouth every 12 (twelve) hours.  -     benzonatate (TESSALON) 100 MG capsule; Take 1 capsule (100 mg total) by mouth 3 (three) times daily as needed for Cough.  Chose keflex due to ckd

## 2017-05-31 NOTE — TELEPHONE ENCOUNTER
----- Message from Sowmya Beard sent at 5/31/2017 10:16 AM CDT -----  Refill: folic acid-vit B6-vit B12 2.5-25-2 mg (FOLBIC) 2.5-25-2 mg Tab    Please send to [facility]. Thank you!

## 2017-08-16 ENCOUNTER — OFFICE VISIT (OUTPATIENT)
Dept: FAMILY MEDICINE | Facility: CLINIC | Age: 82
End: 2017-08-16
Payer: MEDICARE

## 2017-08-16 VITALS
SYSTOLIC BLOOD PRESSURE: 110 MMHG | WEIGHT: 149.25 LBS | DIASTOLIC BLOOD PRESSURE: 70 MMHG | HEART RATE: 77 BPM | HEIGHT: 57 IN | TEMPERATURE: 99 F | OXYGEN SATURATION: 95 % | BODY MASS INDEX: 32.2 KG/M2

## 2017-08-16 DIAGNOSIS — M54.41 CHRONIC RIGHT-SIDED LOW BACK PAIN WITH RIGHT-SIDED SCIATICA: Primary | ICD-10-CM

## 2017-08-16 DIAGNOSIS — G89.29 CHRONIC RIGHT-SIDED LOW BACK PAIN WITH RIGHT-SIDED SCIATICA: Primary | ICD-10-CM

## 2017-08-16 DIAGNOSIS — S46.312A STRAIN OF LEFT TRICEPS, INITIAL ENCOUNTER: ICD-10-CM

## 2017-08-16 PROCEDURE — 99999 PR PBB SHADOW E&M-EST. PATIENT-LVL III: CPT | Mod: PBBFAC,,, | Performed by: FAMILY MEDICINE

## 2017-08-16 PROCEDURE — 1125F AMNT PAIN NOTED PAIN PRSNT: CPT | Mod: S$GLB,,, | Performed by: FAMILY MEDICINE

## 2017-08-16 PROCEDURE — 3008F BODY MASS INDEX DOCD: CPT | Mod: S$GLB,,, | Performed by: FAMILY MEDICINE

## 2017-08-16 PROCEDURE — 1159F MED LIST DOCD IN RCRD: CPT | Mod: S$GLB,,, | Performed by: FAMILY MEDICINE

## 2017-08-16 PROCEDURE — 99499 UNLISTED E&M SERVICE: CPT | Mod: S$GLB,,, | Performed by: FAMILY MEDICINE

## 2017-08-16 PROCEDURE — 99214 OFFICE O/P EST MOD 30 MIN: CPT | Mod: S$GLB,,, | Performed by: FAMILY MEDICINE

## 2017-08-16 RX ORDER — EZETIMIBE 10 MG/1
TABLET ORAL
Refills: 3 | COMMUNITY
Start: 2017-06-30 | End: 2017-08-16 | Stop reason: SDUPTHER

## 2017-08-16 RX ORDER — DICLOFENAC SODIUM 10 MG/G
2 GEL TOPICAL 2 TIMES DAILY
Qty: 100 G | Refills: 5 | Status: SHIPPED | OUTPATIENT
Start: 2017-08-16 | End: 2018-04-25 | Stop reason: SDDI

## 2017-08-16 NOTE — PROGRESS NOTES
"Subjective:       Patient ID: Maritza Rivas is a 89 y.o. female.    Chief Complaint: Right Hip Pain/ burning sensation and Left Arm Pain/ Weakness    Subjective:    Maritza Rivas is a 89 y.o. female who presents with right hip pain. Onset of the symptoms was 5 months ago. Inciting event: none. The patient reports the hip pain is aggravated by walking and is worse with standing for long periods of time. Aggravating symptoms include: any weight bearing, rising after sitting and standing. Patient has had no prior hip problems. Previous visits for this problem: none. Evaluation to date: none. Treatment to date: aspercreme on this.  Subjective:    Maritza Rivas is a 89 y.o. female who presents with left shoulder pain. The symptoms began 1 year ago. Aggravating factors: no known event. Pain is located diffusely throughout the shoulder. Discomfort is described as aching and has a straining senstaion. Symptoms are exacerbated by overhead movements. Evaluation to date: none. Therapy to date includes: nothing specific.        Review of Systems    Objective:       Vitals:    08/16/17 1039   BP: 110/70   Pulse: 77   Temp: 98.8 °F (37.1 °C)   TempSrc: Oral   SpO2: 95%   Weight: 67.7 kg (149 lb 4 oz)   Height: 4' 8.5" (1.435 m)       Physical Exam   Constitutional: She appears well-developed and well-nourished. No distress.   Musculoskeletal:        Left shoulder: She exhibits tenderness and pain. She exhibits normal range of motion, no bony tenderness, no swelling, no crepitus, no deformity, no laceration and normal strength.        Right hip: She exhibits normal range of motion, normal strength, no tenderness, no bony tenderness, no crepitus and no deformity.        Lumbar back: She exhibits tenderness and pain. She exhibits normal range of motion, no edema, no deformity, no laceration and no spasm.        Back:         Arms:  Skin: She is not diaphoretic.       Assessment:       1. Chronic right-sided low back pain " with right-sided sciatica    2. Strain of left triceps, initial encounter        Plan:       Maritza was seen today for right hip pain/ burning sensation and left arm pain/ weakness.    Diagnoses and all orders for this visit:    Chronic right-sided low back pain with right-sided sciatica  -     diclofenac sodium (VOLTAREN) 1 % Gel; Apply 2 g topically 2 (two) times daily.    Strain of left triceps, initial encounter  -     diclofenac sodium (VOLTAREN) 1 % Gel; Apply 2 g topically 2 (two) times daily.    TOPICAL VOLTAREN AND SHE IS ON ASPIRIN  AND HAS KIDNEY DISEASE.

## 2017-08-22 DIAGNOSIS — E55.9 HYPOVITAMINOSIS D: ICD-10-CM

## 2017-08-22 RX ORDER — ERGOCALCIFEROL 1.25 MG/1
CAPSULE ORAL
Qty: 12 CAPSULE | Refills: 1 | Status: SHIPPED | OUTPATIENT
Start: 2017-08-22 | End: 2017-12-18 | Stop reason: SDUPTHER

## 2017-10-18 ENCOUNTER — CLINICAL SUPPORT (OUTPATIENT)
Dept: FAMILY MEDICINE | Facility: CLINIC | Age: 82
End: 2017-10-18
Payer: MEDICARE

## 2017-10-18 DIAGNOSIS — Z23 NEED FOR PROPHYLACTIC VACCINATION AND INOCULATION AGAINST INFLUENZA: Primary | ICD-10-CM

## 2017-10-18 PROCEDURE — 90662 IIV NO PRSV INCREASED AG IM: CPT | Mod: S$GLB,,, | Performed by: FAMILY MEDICINE

## 2017-10-18 PROCEDURE — G0008 ADMIN INFLUENZA VIRUS VAC: HCPCS | Mod: S$GLB,,, | Performed by: FAMILY MEDICINE

## 2017-12-18 DIAGNOSIS — E55.9 HYPOVITAMINOSIS D: ICD-10-CM

## 2017-12-18 RX ORDER — ERGOCALCIFEROL 1.25 MG/1
CAPSULE ORAL
Qty: 12 CAPSULE | Refills: 1 | Status: SHIPPED | OUTPATIENT
Start: 2017-12-18 | End: 2018-04-25 | Stop reason: SDUPTHER

## 2018-04-25 ENCOUNTER — OFFICE VISIT (OUTPATIENT)
Dept: FAMILY MEDICINE | Facility: CLINIC | Age: 83
End: 2018-04-25
Payer: MEDICARE

## 2018-04-25 VITALS
RESPIRATION RATE: 16 BRPM | DIASTOLIC BLOOD PRESSURE: 70 MMHG | BODY MASS INDEX: 32.2 KG/M2 | OXYGEN SATURATION: 94 % | SYSTOLIC BLOOD PRESSURE: 114 MMHG | HEART RATE: 68 BPM | HEIGHT: 57 IN | TEMPERATURE: 98 F | WEIGHT: 149.25 LBS

## 2018-04-25 DIAGNOSIS — E03.9 HYPOTHYROIDISM, UNSPECIFIED TYPE: ICD-10-CM

## 2018-04-25 DIAGNOSIS — E11.8 DM TYPE 2, CONTROLLED, WITH COMPLICATION: Primary | ICD-10-CM

## 2018-04-25 DIAGNOSIS — I10 ESSENTIAL HYPERTENSION: ICD-10-CM

## 2018-04-25 DIAGNOSIS — I50.32 CHRONIC DIASTOLIC HEART FAILURE: ICD-10-CM

## 2018-04-25 DIAGNOSIS — E78.5 HYPERLIPIDEMIA, UNSPECIFIED HYPERLIPIDEMIA TYPE: ICD-10-CM

## 2018-04-25 DIAGNOSIS — I70.1 RENAL ARTERY ATHEROSCLEROSIS: ICD-10-CM

## 2018-04-25 DIAGNOSIS — E55.9 VITAMIN D DEFICIENCY: ICD-10-CM

## 2018-04-25 PROCEDURE — 99999 PR PBB SHADOW E&M-EST. PATIENT-LVL III: CPT | Mod: PBBFAC,,, | Performed by: FAMILY MEDICINE

## 2018-04-25 PROCEDURE — 99214 OFFICE O/P EST MOD 30 MIN: CPT | Mod: S$GLB,,, | Performed by: FAMILY MEDICINE

## 2018-04-25 PROCEDURE — 99499 UNLISTED E&M SERVICE: CPT | Mod: S$GLB,,, | Performed by: FAMILY MEDICINE

## 2018-04-25 RX ORDER — VALSARTAN 40 MG/1
40 TABLET ORAL DAILY
Qty: 90 TABLET | Refills: 1 | Status: SHIPPED | OUTPATIENT
Start: 2018-04-25 | End: 2018-10-30

## 2018-04-25 RX ORDER — PRAVASTATIN SODIUM 20 MG/1
20 TABLET ORAL DAILY
Qty: 90 TABLET | Refills: 1 | Status: SHIPPED | OUTPATIENT
Start: 2018-04-25 | End: 2019-04-28 | Stop reason: SDUPTHER

## 2018-04-25 RX ORDER — ERGOCALCIFEROL 1.25 MG/1
50000 CAPSULE ORAL
Qty: 12 CAPSULE | Refills: 0 | Status: SHIPPED | OUTPATIENT
Start: 2018-04-25 | End: 2018-09-14 | Stop reason: SDUPTHER

## 2018-04-25 RX ORDER — PRAVASTATIN SODIUM 20 MG/1
20 TABLET ORAL DAILY
COMMUNITY
End: 2018-04-25 | Stop reason: SDUPTHER

## 2018-04-25 RX ORDER — LEVOTHYROXINE SODIUM 75 UG/1
TABLET ORAL
Qty: 30 TABLET | Refills: 9 | Status: SHIPPED | OUTPATIENT
Start: 2018-04-25

## 2018-04-25 RX ORDER — CARVEDILOL 12.5 MG/1
TABLET ORAL
Qty: 180 TABLET | Refills: 1 | Status: SHIPPED | OUTPATIENT
Start: 2018-04-25 | End: 2024-02-12 | Stop reason: SDUPTHER

## 2018-04-25 RX ORDER — AMLODIPINE BESYLATE 10 MG/1
10 TABLET ORAL DAILY
Qty: 90 TABLET | Refills: 1 | Status: SHIPPED | OUTPATIENT
Start: 2018-04-25 | End: 2018-10-12 | Stop reason: SDUPTHER

## 2018-04-25 NOTE — TELEPHONE ENCOUNTER
----- Message from Aramis Toussaint sent at 4/25/2018 12:04 PM CDT -----  Contact: Shereen/[facility]/[phone]  Refill:  carvedilol (COREG) 12.5 MG tablet (Pt is requesting a 90 day supply.)    .  [facility]/pharmacy #[id number] - [place] - [address]  [address] [address]  Phone: [phone] Fax: [phone]    Thank you.

## 2018-04-25 NOTE — PROGRESS NOTES
"Subjective:       Patient ID: Maritza Rivas is a 89 y.o. female.    Chief Complaint: Discuss frequent headaches    Hypertension   This is a chronic problem. The current episode started more than 1 year ago. The problem is controlled. Pertinent negatives include no blurred vision, chest pain, malaise/fatigue, palpitations, peripheral edema or shortness of breath. Risk factors for coronary artery disease include post-menopausal state, diabetes mellitus and dyslipidemia. Past treatments include calcium channel blockers, beta blockers and angiotensin blockers. The current treatment provides significant improvement.     Review of Systems   Constitutional: Negative for malaise/fatigue.   Eyes: Negative for blurred vision.   Respiratory: Negative for shortness of breath.    Cardiovascular: Negative for chest pain and palpitations.       Objective:       Vitals:    04/25/18 1117   BP: 114/70   Pulse: 68   Resp: 16   Temp: 98.4 °F (36.9 °C)   TempSrc: Oral   SpO2: (!) 94%   Weight: 67.7 kg (149 lb 4 oz)   Height: 4' 8.5" (1.435 m)     3  Physical Exam   Constitutional: She is oriented to person, place, and time. She appears well-developed and well-nourished. No distress.   HENT:   Head: Normocephalic and atraumatic.   Eyes: Conjunctivae are normal.   Neck: Normal range of motion. Neck supple. Carotid bruit is not present.   Cardiovascular: Normal rate, regular rhythm and normal heart sounds.  Exam reveals no gallop and no friction rub.    No murmur heard.  Pulmonary/Chest: Effort normal and breath sounds normal. No respiratory distress. She has no wheezes. She has no rales.   Musculoskeletal: She exhibits no edema.   Neurological: She is alert and oriented to person, place, and time.   Skin: She is not diaphoretic.       Assessment:       1. DM type 2, controlled, with complication    2. Essential hypertension    3. Chronic diastolic heart failure    4. Renal artery atherosclerosis    5. Vitamin D deficiency    6. " Hyperlipidemia, unspecified hyperlipidemia type    7. Hypothyroidism, unspecified type        Plan:       Maritza was seen today for discuss frequent headaches.    Diagnoses and all orders for this visit:    DM type 2, controlled, with complication  -     Pneumococcal Polysaccharide Vaccine (23 Valent) (SQ/IM)  -     CBC auto differential; Future  -     Comprehensive metabolic panel; Future  -     Hemoglobin A1c; Future  -     Lipid panel; Future  -     TSH; Future  Stable. Refilled meds and due for labs    Essential hypertension  -     amLODIPine (NORVASC) 10 MG tablet; Take 1 tablet (10 mg total) by mouth once daily.  -     valsartan (DIOVAN) 40 MG tablet; Take 1 tablet (40 mg total) by mouth once daily.  Stable. Refilled meds.     Chronic diastolic heart failure  -     CBC auto differential; Future  -     Comprehensive metabolic panel; Future  -     TSH; Future    Renal artery atherosclerosis    Vitamin D deficiency  -     ergocalciferol (VITAMIN D2) 50,000 unit Cap; Take 1 capsule (50,000 Units total) by mouth every 7 days.  -     pravastatin (PRAVACHOL) 20 MG tablet; Take 1 tablet (20 mg total) by mouth once daily.    Hyperlipidemia, unspecified hyperlipidemia type    Hypothyroidism, unspecified type  -     Lipid panel; Future  -     TSH; Future  -     levothyroxine (SYNTHROID) 75 MCG tablet; TAKE 1 TABLET BY MOUTH BEFORE BREAKFAST.  Stable AND DUE FOR LABS

## 2018-05-02 ENCOUNTER — LAB VISIT (OUTPATIENT)
Dept: LAB | Facility: HOSPITAL | Age: 83
End: 2018-05-02
Attending: FAMILY MEDICINE
Payer: MEDICARE

## 2018-05-02 DIAGNOSIS — E11.8 DM TYPE 2, CONTROLLED, WITH COMPLICATION: ICD-10-CM

## 2018-05-02 DIAGNOSIS — I50.32 CHRONIC DIASTOLIC HEART FAILURE: ICD-10-CM

## 2018-05-02 DIAGNOSIS — E03.9 HYPOTHYROIDISM, UNSPECIFIED TYPE: ICD-10-CM

## 2018-05-02 LAB
ALBUMIN SERPL BCP-MCNC: 4.1 G/DL
ALP SERPL-CCNC: 92 U/L
ALT SERPL W/O P-5'-P-CCNC: 27 U/L
ANION GAP SERPL CALC-SCNC: 8 MMOL/L
AST SERPL-CCNC: 30 U/L
BASOPHILS # BLD AUTO: 0.06 K/UL
BASOPHILS NFR BLD: 1 %
BILIRUB SERPL-MCNC: 1 MG/DL
BUN SERPL-MCNC: 30 MG/DL
CALCIUM SERPL-MCNC: 10.5 MG/DL
CHLORIDE SERPL-SCNC: 104 MMOL/L
CHOLEST SERPL-MCNC: 157 MG/DL
CHOLEST/HDLC SERPL: 3 {RATIO}
CO2 SERPL-SCNC: 28 MMOL/L
CREAT SERPL-MCNC: 1.3 MG/DL
DIFFERENTIAL METHOD: NORMAL
EOSINOPHIL # BLD AUTO: 0.2 K/UL
EOSINOPHIL NFR BLD: 4.1 %
ERYTHROCYTE [DISTWIDTH] IN BLOOD BY AUTOMATED COUNT: 13.9 %
EST. GFR  (AFRICAN AMERICAN): 41 ML/MIN/1.73 M^2
EST. GFR  (NON AFRICAN AMERICAN): 36 ML/MIN/1.73 M^2
ESTIMATED AVG GLUCOSE: 117 MG/DL
GLUCOSE SERPL-MCNC: 102 MG/DL
HBA1C MFR BLD HPLC: 5.7 %
HCT VFR BLD AUTO: 39.9 %
HDLC SERPL-MCNC: 53 MG/DL
HDLC SERPL: 33.8 %
HGB BLD-MCNC: 13.4 G/DL
LDLC SERPL CALC-MCNC: 75.8 MG/DL
LYMPHOCYTES # BLD AUTO: 1.6 K/UL
LYMPHOCYTES NFR BLD: 27.9 %
MCH RBC QN AUTO: 27.9 PG
MCHC RBC AUTO-ENTMCNC: 33.6 G/DL
MCV RBC AUTO: 83 FL
MONOCYTES # BLD AUTO: 0.6 K/UL
MONOCYTES NFR BLD: 10.8 %
NEUTROPHILS # BLD AUTO: 3.3 K/UL
NEUTROPHILS NFR BLD: 56 %
NONHDLC SERPL-MCNC: 104 MG/DL
PLATELET # BLD AUTO: 194 K/UL
PMV BLD AUTO: 11.9 FL
POTASSIUM SERPL-SCNC: 4.6 MMOL/L
PROT SERPL-MCNC: 7.4 G/DL
RBC # BLD AUTO: 4.81 M/UL
SODIUM SERPL-SCNC: 140 MMOL/L
TRIGL SERPL-MCNC: 141 MG/DL
TSH SERPL DL<=0.005 MIU/L-ACNC: 2.8 UIU/ML
WBC # BLD AUTO: 5.81 K/UL

## 2018-05-02 PROCEDURE — 84443 ASSAY THYROID STIM HORMONE: CPT

## 2018-05-02 PROCEDURE — 80061 LIPID PANEL: CPT

## 2018-05-02 PROCEDURE — 80053 COMPREHEN METABOLIC PANEL: CPT

## 2018-05-02 PROCEDURE — 36415 COLL VENOUS BLD VENIPUNCTURE: CPT | Mod: PO

## 2018-05-02 PROCEDURE — 85025 COMPLETE CBC W/AUTO DIFF WBC: CPT

## 2018-05-02 PROCEDURE — 83036 HEMOGLOBIN GLYCOSYLATED A1C: CPT

## 2018-05-28 ENCOUNTER — OFFICE VISIT (OUTPATIENT)
Dept: FAMILY MEDICINE | Facility: CLINIC | Age: 83
End: 2018-05-28
Payer: MEDICARE

## 2018-05-28 VITALS
DIASTOLIC BLOOD PRESSURE: 60 MMHG | BODY MASS INDEX: 32.25 KG/M2 | HEART RATE: 69 BPM | WEIGHT: 149.5 LBS | HEIGHT: 57 IN | OXYGEN SATURATION: 96 % | RESPIRATION RATE: 16 BRPM | SYSTOLIC BLOOD PRESSURE: 110 MMHG | TEMPERATURE: 98 F

## 2018-05-28 DIAGNOSIS — R09.82 POST-NASAL DRIP: ICD-10-CM

## 2018-05-28 DIAGNOSIS — N18.30 CKD (CHRONIC KIDNEY DISEASE), STAGE III: Primary | ICD-10-CM

## 2018-05-28 PROCEDURE — 99499 UNLISTED E&M SERVICE: CPT | Mod: S$GLB,,, | Performed by: PHYSICIAN ASSISTANT

## 2018-05-28 PROCEDURE — 99213 OFFICE O/P EST LOW 20 MIN: CPT | Mod: S$GLB,,, | Performed by: PHYSICIAN ASSISTANT

## 2018-05-28 PROCEDURE — 99999 PR PBB SHADOW E&M-EST. PATIENT-LVL V: CPT | Mod: PBBFAC,,, | Performed by: PHYSICIAN ASSISTANT

## 2018-05-29 ENCOUNTER — LAB VISIT (OUTPATIENT)
Dept: LAB | Facility: HOSPITAL | Age: 83
End: 2018-05-29
Attending: FAMILY MEDICINE
Payer: MEDICARE

## 2018-05-29 DIAGNOSIS — E11.9 CONTROLLED TYPE 2 DIABETES MELLITUS WITHOUT COMPLICATION, WITHOUT LONG-TERM CURRENT USE OF INSULIN: ICD-10-CM

## 2018-05-29 PROCEDURE — 82043 UR ALBUMIN QUANTITATIVE: CPT

## 2018-05-30 LAB
CREAT UR-MCNC: 59 MG/DL
MICROALBUMIN UR DL<=1MG/L-MCNC: 12 UG/ML
MICROALBUMIN/CREATININE RATIO: 20.3 UG/MG

## 2018-05-31 ENCOUNTER — TELEPHONE (OUTPATIENT)
Dept: ADMINISTRATIVE | Facility: HOSPITAL | Age: 83
End: 2018-05-31

## 2018-06-25 RX ORDER — FOLIC ACID-PYRIDOXINE-CYANOCOBALAMIN TAB 2.5-25-2 MG 2.5-25-2 MG
1 TAB ORAL DAILY
Qty: 30 TABLET | Refills: 11 | Status: SHIPPED | OUTPATIENT
Start: 2018-06-25 | End: 2019-06-26 | Stop reason: SDUPTHER

## 2018-07-05 ENCOUNTER — TELEPHONE (OUTPATIENT)
Dept: FAMILY MEDICINE | Facility: CLINIC | Age: 83
End: 2018-07-05

## 2018-07-05 NOTE — TELEPHONE ENCOUNTER
----- Message from Billy Anderson sent at 7/5/2018 12:50 PM CDT -----  Contact: ALDEN VOGEL [[id number]]        Name of Who is Calling: ALDEN VOGEL [[id number]]      What is the request in detail: Patient would like to have lab work from a month ago mailed to her please.      Can the clinic reply by [facility]: no      What Number to Call Back if not in [facility]: [phone]

## 2018-09-14 DIAGNOSIS — E55.9 VITAMIN D DEFICIENCY: ICD-10-CM

## 2018-09-14 RX ORDER — ERGOCALCIFEROL 1.25 MG/1
CAPSULE ORAL
Qty: 12 CAPSULE | Refills: 0 | Status: SHIPPED | OUTPATIENT
Start: 2018-09-14 | End: 2018-12-10 | Stop reason: SDUPTHER

## 2018-10-12 DIAGNOSIS — I10 ESSENTIAL HYPERTENSION: ICD-10-CM

## 2018-10-15 ENCOUNTER — CLINICAL SUPPORT (OUTPATIENT)
Dept: FAMILY MEDICINE | Facility: CLINIC | Age: 83
End: 2018-10-15
Payer: MEDICARE

## 2018-10-15 DIAGNOSIS — Z23 FLU VACCINE NEED: Primary | ICD-10-CM

## 2018-10-15 PROCEDURE — 90662 IIV NO PRSV INCREASED AG IM: CPT | Mod: PBBFAC,PO

## 2018-10-15 RX ORDER — AMLODIPINE BESYLATE 10 MG/1
TABLET ORAL
Qty: 90 TABLET | Refills: 1 | Status: SHIPPED | OUTPATIENT
Start: 2018-10-15 | End: 2018-10-30 | Stop reason: SDUPTHER

## 2018-10-28 DIAGNOSIS — I10 ESSENTIAL HYPERTENSION: ICD-10-CM

## 2018-10-29 RX ORDER — AMLODIPINE BESYLATE 10 MG/1
TABLET ORAL
Qty: 90 TABLET | Refills: 1 | Status: SHIPPED | OUTPATIENT
Start: 2018-10-29 | End: 2018-10-30 | Stop reason: SDUPTHER

## 2018-10-30 ENCOUNTER — OFFICE VISIT (OUTPATIENT)
Dept: FAMILY MEDICINE | Facility: CLINIC | Age: 83
End: 2018-10-30
Payer: MEDICARE

## 2018-10-30 VITALS
DIASTOLIC BLOOD PRESSURE: 70 MMHG | HEART RATE: 75 BPM | OXYGEN SATURATION: 96 % | HEIGHT: 57 IN | WEIGHT: 145.5 LBS | SYSTOLIC BLOOD PRESSURE: 150 MMHG | BODY MASS INDEX: 31.39 KG/M2 | TEMPERATURE: 98 F

## 2018-10-30 DIAGNOSIS — I11.9 BENIGN HYPERTENSIVE HEART DISEASE WITHOUT HEART FAILURE: ICD-10-CM

## 2018-10-30 DIAGNOSIS — I10 ESSENTIAL HYPERTENSION: ICD-10-CM

## 2018-10-30 DIAGNOSIS — R73.01 IMPAIRED FASTING GLUCOSE: ICD-10-CM

## 2018-10-30 DIAGNOSIS — E03.9 HYPOTHYROIDISM, UNSPECIFIED TYPE: Primary | ICD-10-CM

## 2018-10-30 PROCEDURE — 99214 OFFICE O/P EST MOD 30 MIN: CPT | Mod: S$PBB,,, | Performed by: FAMILY MEDICINE

## 2018-10-30 PROCEDURE — 99213 OFFICE O/P EST LOW 20 MIN: CPT | Mod: PBBFAC,PO | Performed by: FAMILY MEDICINE

## 2018-10-30 PROCEDURE — 99999 PR PBB SHADOW E&M-EST. PATIENT-LVL III: CPT | Mod: PBBFAC,,, | Performed by: FAMILY MEDICINE

## 2018-10-30 PROCEDURE — 1101F PT FALLS ASSESS-DOCD LE1/YR: CPT | Mod: CPTII,,, | Performed by: FAMILY MEDICINE

## 2018-10-30 RX ORDER — AMLODIPINE BESYLATE 10 MG/1
10 TABLET ORAL DAILY
Qty: 90 TABLET | Refills: 1 | Status: SHIPPED | OUTPATIENT
Start: 2018-10-30

## 2018-10-30 RX ORDER — MINOXIDIL 50 MG/G
1 AEROSOL, FOAM TOPICAL 2 TIMES DAILY
Refills: 5 | COMMUNITY
Start: 2018-10-10 | End: 2024-02-20

## 2018-10-30 RX ORDER — VALSARTAN 80 MG/1
80 TABLET ORAL DAILY
Qty: 90 TABLET | Refills: 1 | Status: SHIPPED | OUTPATIENT
Start: 2018-10-30 | End: 2019-04-10

## 2018-10-30 NOTE — PROGRESS NOTES
"Subjective:       Patient ID: Maritza Rivas is a 89 y.o. female.    Chief Complaint: Follow Up/ Hypertension    Patient is here for refills of her medications. She is due for her blood pressure refill and her labs. She states she has been eating more salt as her blood pressure is slightly elevated today. She denies complaints today. \  She has diabetes and is diet controlled. She doesn't take medication and doesn't check her sugars regularly.       Review of Systems   Constitutional: Negative for chills, fatigue and fever.   Respiratory: Negative for cough, chest tightness, shortness of breath and wheezing.    Cardiovascular: Negative for chest pain, palpitations and leg swelling.   Gastrointestinal: Negative for abdominal pain, blood in stool, diarrhea, nausea and vomiting.   Genitourinary: Negative for dysuria, frequency and hematuria.   Skin: Negative for rash.   Neurological: Negative for dizziness, syncope and light-headedness.       Objective:       Vitals:    10/30/18 1329   BP: (!) 150/70   Pulse: 75   Temp: 98.3 °F (36.8 °C)   TempSrc: Oral   SpO2: 96%   Weight: 66 kg (145 lb 8.1 oz)   Height: 4' 8.5" (1.435 m)       Physical Exam   Constitutional: She is oriented to person, place, and time. She appears well-developed and well-nourished. No distress.   HENT:   Head: Normocephalic and atraumatic.   Eyes: Conjunctivae are normal.   Neck: Normal range of motion. Neck supple. Carotid bruit is not present.   Cardiovascular: Normal rate, regular rhythm and normal heart sounds. Exam reveals no gallop and no friction rub.   No murmur heard.  Pulmonary/Chest: Effort normal and breath sounds normal. No stridor. No respiratory distress. She has no wheezes. She has no rales.   Abdominal: Soft. Bowel sounds are normal. She exhibits no distension and no mass. There is no tenderness. There is no rebound and no guarding.   Musculoskeletal: She exhibits no edema.   Neurological: She is alert and oriented to person, " place, and time.   Skin: She is not diaphoretic.       Assessment:       1. Hypothyroidism, unspecified type    2. Essential hypertension    3. Benign hypertensive heart disease without heart failure    4. Impaired fasting glucose        Plan:       Maritza was seen today for follow up/ hypertension.    Diagnoses and all orders for this visit:    Hypothyroidism, unspecified type  -     Comprehensive metabolic panel; Future  -     Lipid panel; Future  -     TSH; Future  -     CBC auto differential; Future  -     Hemoglobin A1c; Future  -     Microalbumin/creatinine urine ratio; Future    Essential hypertension  -     amLODIPine (NORVASC) 10 MG tablet; Take 1 tablet (10 mg total) by mouth once daily.  -     Comprehensive metabolic panel; Future  -     Lipid panel; Future  -     TSH; Future  -     CBC auto differential; Future  -     Hemoglobin A1c; Future  -     Microalbumin/creatinine urine ratio; Future  -     valsartan (DIOVAN) 80 MG tablet; Take 1 tablet (80 mg total) by mouth once daily.  Increase valsartan from 40 to 80 mg for better blood pressure control. Avoid sodium.   Benign hypertensive heart disease without heart failure  -     Comprehensive metabolic panel; Future  -     Lipid panel; Future  -     TSH; Future  -     CBC auto differential; Future  -     Hemoglobin A1c; Future  -     Microalbumin/creatinine urine ratio; Future    Impaired fasting glucose  -     Hemoglobin A1c; Future    Other orders  -     (In Office Administered) Pneumococcal Polysaccharide Vaccine (23 Valent) (SQ/IM)

## 2018-10-30 NOTE — MEDICAL/APP STUDENT
"Subjective:       Patient ID: Maritza Rivas is a 89 y.o. female.    Chief Complaint: Follow Up/ Hypertension    89 year old  female presents to clinic for "check-up."      Review of Systems   Constitutional: Negative for activity change, chills and diaphoresis.   HENT: Negative for congestion, ear discharge, ear pain, hearing loss, mouth sores, rhinorrhea, sore throat and trouble swallowing.    Eyes: Negative for pain, discharge, itching and visual disturbance.   Respiratory: Negative for cough, choking and shortness of breath.    Cardiovascular: Positive for leg swelling. Negative for chest pain and palpitations.   Gastrointestinal: Negative for abdominal pain, blood in stool, constipation, diarrhea, nausea and vomiting.   Endocrine: Negative for polydipsia, polyphagia and polyuria.   Genitourinary: Negative for difficulty urinating, dysuria and hematuria.   Musculoskeletal: Negative for back pain, gait problem and joint swelling.   Skin: Negative for color change, rash and wound.   Neurological: Negative for light-headedness, numbness and headaches.       Objective:      Physical Exam   Constitutional: She is oriented to person, place, and time. She appears well-developed and well-nourished. No distress.   HENT:   Head: Normocephalic and atraumatic.   Right Ear: External ear normal.   Left Ear: External ear normal.   Eyes: EOM are normal. Right eye exhibits no discharge. Left eye exhibits no discharge.   Neck: Normal range of motion.   Cardiovascular: Normal rate, regular rhythm and intact distal pulses. Exam reveals no gallop and no friction rub.   Murmur heard.  Pulses:       Dorsalis pedis pulses are 2+ on the right side, and 2+ on the left side.        Posterior tibial pulses are 2+ on the right side, and 2+ on the left side.   Pulmonary/Chest: Effort normal and breath sounds normal. No respiratory distress. She has no wheezes. She has no rales.   Abdominal: Soft. Bowel sounds are normal. She " exhibits no distension and no mass. There is no tenderness. There is no guarding.   Musculoskeletal: Normal range of motion. She exhibits edema. She exhibits no tenderness.   Neurological: She is alert and oriented to person, place, and time.   Skin: Skin is warm and dry. Capillary refill takes less than 2 seconds. She is not diaphoretic.   Bruising noted to left arm   Psychiatric: She has a normal mood and affect. Her behavior is normal.       Assessment:       1. Essential hypertension        Plan:         Maritza was seen today for follow up/ hypertension.    Diagnoses and all orders for this visit:    Hypothyroidism, unspecified type  -     Comprehensive metabolic panel; Future  -     Lipid panel; Future  -     TSH; Future  -     CBC auto differential; Future  -     Hemoglobin A1c; Future  -     Microalbumin/creatinine urine ratio; Future    Essential hypertension  -     amLODIPine (NORVASC) 10 MG tablet; Take 1 tablet (10 mg total) by mouth once daily.  -     Comprehensive metabolic panel; Future  -     Lipid panel; Future  -     TSH; Future  -     CBC auto differential; Future  -     Hemoglobin A1c; Future  -     Microalbumin/creatinine urine ratio; Future  -     valsartan (DIOVAN) 80 MG tablet; Take 1 tablet (80 mg total) by mouth once daily.    Benign hypertensive heart disease without heart failure  -     Comprehensive metabolic panel; Future  -     Lipid panel; Future  -     TSH; Future  -     CBC auto differential; Future  -     Hemoglobin A1c; Future  -     Microalbumin/creatinine urine ratio; Future    Impaired fasting glucose  -     Hemoglobin A1c; Future    Other orders  -     (In Office Administered) Pneumococcal Polysaccharide Vaccine (23 Valent) (SQ/IM)

## 2018-11-08 DIAGNOSIS — I10 ESSENTIAL HYPERTENSION: ICD-10-CM

## 2018-11-08 RX ORDER — VALSARTAN 40 MG/1
TABLET ORAL
Qty: 90 TABLET | Refills: 1 | Status: SHIPPED | OUTPATIENT
Start: 2018-11-08 | End: 2019-04-10

## 2018-12-10 DIAGNOSIS — E55.9 VITAMIN D DEFICIENCY: ICD-10-CM

## 2018-12-10 RX ORDER — ERGOCALCIFEROL 1.25 MG/1
CAPSULE ORAL
Qty: 12 CAPSULE | Refills: 0 | Status: SHIPPED | OUTPATIENT
Start: 2018-12-10 | End: 2019-03-12 | Stop reason: SDUPTHER

## 2019-01-09 ENCOUNTER — LAB VISIT (OUTPATIENT)
Dept: LAB | Facility: HOSPITAL | Age: 84
End: 2019-01-09
Attending: FAMILY MEDICINE
Payer: MEDICARE

## 2019-01-09 DIAGNOSIS — I11.9 BENIGN HYPERTENSIVE HEART DISEASE WITHOUT HEART FAILURE: ICD-10-CM

## 2019-01-09 DIAGNOSIS — R73.01 IMPAIRED FASTING GLUCOSE: ICD-10-CM

## 2019-01-09 DIAGNOSIS — I10 ESSENTIAL HYPERTENSION: ICD-10-CM

## 2019-01-09 DIAGNOSIS — E03.9 HYPOTHYROIDISM, UNSPECIFIED TYPE: ICD-10-CM

## 2019-01-09 LAB
ALBUMIN SERPL BCP-MCNC: 4.1 G/DL
ALP SERPL-CCNC: 103 U/L
ALT SERPL W/O P-5'-P-CCNC: 26 U/L
ANION GAP SERPL CALC-SCNC: 7 MMOL/L
AST SERPL-CCNC: 26 U/L
BASOPHILS # BLD AUTO: 0.07 K/UL
BASOPHILS NFR BLD: 1.3 %
BILIRUB SERPL-MCNC: 1.1 MG/DL
BUN SERPL-MCNC: 26 MG/DL
CALCIUM SERPL-MCNC: 10.4 MG/DL
CHLORIDE SERPL-SCNC: 104 MMOL/L
CHOLEST SERPL-MCNC: 166 MG/DL
CHOLEST/HDLC SERPL: 2.9 {RATIO}
CO2 SERPL-SCNC: 30 MMOL/L
CREAT SERPL-MCNC: 1.1 MG/DL
DIFFERENTIAL METHOD: NORMAL
EOSINOPHIL # BLD AUTO: 0.3 K/UL
EOSINOPHIL NFR BLD: 5.4 %
ERYTHROCYTE [DISTWIDTH] IN BLOOD BY AUTOMATED COUNT: 14.1 %
EST. GFR  (AFRICAN AMERICAN): 50 ML/MIN/1.73 M^2
EST. GFR  (NON AFRICAN AMERICAN): 44 ML/MIN/1.73 M^2
GLUCOSE SERPL-MCNC: 98 MG/DL
HCT VFR BLD AUTO: 39.8 %
HDLC SERPL-MCNC: 57 MG/DL
HDLC SERPL: 34.3 %
HGB BLD-MCNC: 13.1 G/DL
LDLC SERPL CALC-MCNC: 79 MG/DL
LYMPHOCYTES # BLD AUTO: 1.5 K/UL
LYMPHOCYTES NFR BLD: 27.5 %
MCH RBC QN AUTO: 28.5 PG
MCHC RBC AUTO-ENTMCNC: 32.9 G/DL
MCV RBC AUTO: 87 FL
MONOCYTES # BLD AUTO: 0.5 K/UL
MONOCYTES NFR BLD: 9.2 %
NEUTROPHILS # BLD AUTO: 3.1 K/UL
NEUTROPHILS NFR BLD: 56.6 %
NONHDLC SERPL-MCNC: 109 MG/DL
PLATELET # BLD AUTO: 189 K/UL
PMV BLD AUTO: 11.7 FL
POTASSIUM SERPL-SCNC: 4.6 MMOL/L
PROT SERPL-MCNC: 7.4 G/DL
RBC # BLD AUTO: 4.6 M/UL
SODIUM SERPL-SCNC: 141 MMOL/L
TRIGL SERPL-MCNC: 150 MG/DL
TSH SERPL DL<=0.005 MIU/L-ACNC: 2.78 UIU/ML
WBC # BLD AUTO: 5.53 K/UL

## 2019-01-09 PROCEDURE — 85025 COMPLETE CBC W/AUTO DIFF WBC: CPT

## 2019-01-09 PROCEDURE — 84443 ASSAY THYROID STIM HORMONE: CPT

## 2019-01-09 PROCEDURE — 80061 LIPID PANEL: CPT

## 2019-01-09 PROCEDURE — 80053 COMPREHEN METABOLIC PANEL: CPT

## 2019-01-09 PROCEDURE — 36415 COLL VENOUS BLD VENIPUNCTURE: CPT | Mod: PO

## 2019-01-09 PROCEDURE — 83036 HEMOGLOBIN GLYCOSYLATED A1C: CPT

## 2019-01-10 DIAGNOSIS — E03.9 HYPOTHYROIDISM, UNSPECIFIED TYPE: ICD-10-CM

## 2019-01-10 DIAGNOSIS — E11.9 DIET-CONTROLLED DIABETES MELLITUS: ICD-10-CM

## 2019-01-10 DIAGNOSIS — I11.9 BENIGN HYPERTENSIVE HEART DISEASE WITHOUT HEART FAILURE: Primary | ICD-10-CM

## 2019-01-10 LAB
ESTIMATED AVG GLUCOSE: 114 MG/DL
HBA1C MFR BLD HPLC: 5.6 %

## 2019-03-12 DIAGNOSIS — E55.9 VITAMIN D DEFICIENCY: ICD-10-CM

## 2019-03-12 RX ORDER — ERGOCALCIFEROL 1.25 MG/1
CAPSULE ORAL
Qty: 12 CAPSULE | Refills: 0 | Status: SHIPPED | OUTPATIENT
Start: 2019-03-12 | End: 2024-02-20

## 2019-04-10 ENCOUNTER — OFFICE VISIT (OUTPATIENT)
Dept: FAMILY MEDICINE | Facility: CLINIC | Age: 84
End: 2019-04-10
Payer: MEDICARE

## 2019-04-10 VITALS
BODY MASS INDEX: 31.73 KG/M2 | HEIGHT: 57 IN | SYSTOLIC BLOOD PRESSURE: 160 MMHG | DIASTOLIC BLOOD PRESSURE: 70 MMHG | TEMPERATURE: 98 F | OXYGEN SATURATION: 95 % | HEART RATE: 70 BPM | WEIGHT: 147.06 LBS

## 2019-04-10 DIAGNOSIS — N18.30 CKD (CHRONIC KIDNEY DISEASE) STAGE 3, GFR 30-59 ML/MIN: ICD-10-CM

## 2019-04-10 DIAGNOSIS — I10 ESSENTIAL HYPERTENSION: Primary | ICD-10-CM

## 2019-04-10 DIAGNOSIS — I50.32 CHRONIC DIASTOLIC HEART FAILURE: ICD-10-CM

## 2019-04-10 PROCEDURE — 1101F PT FALLS ASSESS-DOCD LE1/YR: CPT | Mod: CPTII,S$GLB,, | Performed by: FAMILY MEDICINE

## 2019-04-10 PROCEDURE — 99214 PR OFFICE/OUTPT VISIT, EST, LEVL IV, 30-39 MIN: ICD-10-PCS | Mod: S$GLB,,, | Performed by: FAMILY MEDICINE

## 2019-04-10 PROCEDURE — 99499 RISK ADDL DX/OHS AUDIT: ICD-10-PCS | Mod: S$GLB,,, | Performed by: FAMILY MEDICINE

## 2019-04-10 PROCEDURE — 99999 PR PBB SHADOW E&M-EST. PATIENT-LVL IV: ICD-10-PCS | Mod: PBBFAC,,, | Performed by: FAMILY MEDICINE

## 2019-04-10 PROCEDURE — 1101F PR PT FALLS ASSESS DOC 0-1 FALLS W/OUT INJ PAST YR: ICD-10-PCS | Mod: CPTII,S$GLB,, | Performed by: FAMILY MEDICINE

## 2019-04-10 PROCEDURE — 99214 OFFICE O/P EST MOD 30 MIN: CPT | Mod: S$GLB,,, | Performed by: FAMILY MEDICINE

## 2019-04-10 PROCEDURE — 99499 UNLISTED E&M SERVICE: CPT | Mod: S$GLB,,, | Performed by: FAMILY MEDICINE

## 2019-04-10 PROCEDURE — 99999 PR PBB SHADOW E&M-EST. PATIENT-LVL IV: CPT | Mod: PBBFAC,,, | Performed by: FAMILY MEDICINE

## 2019-04-10 RX ORDER — IRBESARTAN 150 MG/1
150 TABLET ORAL NIGHTLY
Qty: 90 TABLET | Refills: 3 | Status: SHIPPED | OUTPATIENT
Start: 2019-04-10 | End: 2019-05-07 | Stop reason: SINTOL

## 2019-04-10 NOTE — PROGRESS NOTES
Subjective:       Patient ID: Maritza Rivas is a 89 y.o. female.    Chief Complaint: Follow Up/ Hypertension; Discuss BP meds; and Diabetic Foot Exam    Hypertension   This is a chronic problem. The current episode started more than 1 year ago. The problem is unchanged. The problem is uncontrolled. Pertinent negatives include no chest pain, headaches, malaise/fatigue, palpitations, peripheral edema or shortness of breath. Past treatments include angiotensin blockers, beta blockers and calcium channel blockers (valsartan 80 mg daily, amlodipine 10 mg, and coreg 12.5 mg ).     Past Medical History:   Diagnosis Date    Aortic stenosis     Aortic valve stenosis, critical     Carotid bruit     Carpal tunnel syndrome, bilateral     Coronary artery disease     Diabetes mellitus type II     H/O aortic valve replacement     Hypertension     Kidney insufficiency     PCP stated Stage 3    Pure hyperglyceridemia     Thyroid disease     hypothyroidism      Past Surgical History:   Procedure Laterality Date    ADENOIDECTOMY      AORTIC VALVE REPLACEMENT  2013    CARDIAC SURGERY  2013    3 VESSEL bypass    CATARACT EXTRACTION BILATERAL W/ ANTERIOR VITRECTOMY      CATHETERIZATION, HEART, LEFT Left 9/26/2013    Performed by Stephanie Brown [facility] at [facility] CATH LAB    CORONARY ARTERY BYPASS GRAFT (CABG) N/A 11/5/2013    Performed by Rashid Brewer III, MD at [facility] [place] [place] [facility] [place]    EXCISION-CYST-HEAD N/A 6/6/2016    Performed by Alexander Gilmore MD at [facility] OR    INSERTION, CATHETER, RIGHT HEART Right 9/26/2013    Performed by Stephanie Brown MD at [facility] CATH LAB    REPLACEMENT, AORTIC VALVE N/A 11/5/2013    Performed by Rashid Brewer III, MD at [facility] [place] [place]    TONSILLECTOMY  1946    VAGINAL DELIVERY      x9 normal     Family History   Problem Relation Age of Onset    Stroke Mother     Diabetes Mother     Heart disease Father      Social History     Socioeconomic History    Marital status:      Spouse  "name: Not on file    Number of children: Not on file    Years of education: Not on file    Highest education level: Not on file   Occupational History    Not on file   Social Needs    Financial resource strain: Not on file    Food insecurity:     Worry: Not on file     Inability: Not on file    Transportation needs:     Medical: Not on file     Non-medical: Not on file   Tobacco Use    Smoking status: Never Smoker   Substance and Sexual Activity    Alcohol use: No    Drug use: No    Sexual activity: Never   Lifestyle    Physical activity:     Days per week: Not on file     Minutes per session: Not on file    Stress: Not on file   Relationships    Social connections:     Talks on phone: Not on file     Gets together: Not on file     Attends Orthodoxy service: Not on file     Active member of club or organization: Not on file     Attends meetings of clubs or organizations: Not on file     Relationship status: Not on file   Other Topics Concern    Not on file   Social History Narrative    Not on file     Dictation #1  MRN:[id number]  CSN:[id number]      Review of Systems   Constitutional: Negative for malaise/fatigue.   Respiratory: Negative for shortness of breath.    Cardiovascular: Negative for chest pain and palpitations.   Neurological: Negative for headaches.       Objective:       Vitals:    04/10/19 1407   BP: (!) 160/70   Pulse: 70   Temp: 98.2 °F (36.8 °C)   TempSrc: Oral   SpO2: 95%   Weight: 66.7 kg (147 lb 0.8 oz)   Height: 4' 8.5" (1.435 m)       Physical Exam   Constitutional: She is oriented to person, place, and time. She appears well-developed and well-nourished. No distress.   HENT:   Head: Normocephalic and atraumatic.   Eyes: Conjunctivae are normal.   Neck: Normal range of motion. Neck supple. Carotid bruit is not present.   Cardiovascular: Normal rate, regular rhythm and normal heart sounds. Exam reveals no gallop and no friction rub.   No murmur heard.  Pulmonary/Chest: Effort normal " and breath sounds normal. No respiratory distress. She has no wheezes. She has no rales.   Musculoskeletal: She exhibits no edema.   Neurological: She is alert and oriented to person, place, and time.   Skin: She is not diaphoretic.       Assessment:       1. Essential hypertension    2. CKD (chronic kidney disease) stage 3, GFR 30-59 ml/min    3. Chronic diastolic heart failure        Plan:       Maritza was seen today for follow up/ hypertension, discuss bp meds and diabetic foot exam.    Diagnoses and all orders for this visit:    Essential hypertension  -     irbesartan (AVAPRO) 150 MG tablet; Take 1 tablet (150 mg total) by mouth every evening.  stop valsartan and start avapro 150 mg daily. \    CKD (chronic kidney disease) stage 3, GFR 30-59 ml/min  Stable based on labs in January.     Chronic diastolic heart failure  Scheduled for cardiology, Dr. Sheng Landers in June 2019.

## 2019-04-28 DIAGNOSIS — E55.9 VITAMIN D DEFICIENCY: ICD-10-CM

## 2019-04-29 RX ORDER — PRAVASTATIN SODIUM 20 MG/1
TABLET ORAL
Qty: 90 TABLET | Refills: 0 | Status: SHIPPED | OUTPATIENT
Start: 2019-04-29

## 2019-05-01 ENCOUNTER — TELEPHONE (OUTPATIENT)
Dept: FAMILY MEDICINE | Facility: CLINIC | Age: 84
End: 2019-05-01

## 2019-05-01 DIAGNOSIS — I10 ESSENTIAL HYPERTENSION: ICD-10-CM

## 2019-05-01 RX ORDER — VALSARTAN 80 MG/1
TABLET ORAL
Qty: 90 TABLET | Refills: 1 | Status: SHIPPED | OUTPATIENT
Start: 2019-05-01 | End: 2019-05-07 | Stop reason: SDUPTHER

## 2019-05-01 NOTE — TELEPHONE ENCOUNTER
Call and cancel valsartan 80mg to [id number] - [place] - [address]    Fax   [phone]

## 2019-05-01 NOTE — TELEPHONE ENCOUNTER
Call paced to [facility] and spoke with [facility] tech. Verbal order given per  to stop Valsartan 80 mg. Order read back and confirmed.

## 2019-05-07 ENCOUNTER — OFFICE VISIT (OUTPATIENT)
Dept: FAMILY MEDICINE | Facility: CLINIC | Age: 84
End: 2019-05-07
Payer: MEDICARE

## 2019-05-07 VITALS
TEMPERATURE: 98 F | OXYGEN SATURATION: 96 % | HEART RATE: 69 BPM | BODY MASS INDEX: 32.29 KG/M2 | WEIGHT: 146.63 LBS | DIASTOLIC BLOOD PRESSURE: 80 MMHG | SYSTOLIC BLOOD PRESSURE: 180 MMHG

## 2019-05-07 DIAGNOSIS — I10 ESSENTIAL HYPERTENSION: Primary | ICD-10-CM

## 2019-05-07 DIAGNOSIS — E11.9 DIET-CONTROLLED DIABETES MELLITUS: ICD-10-CM

## 2019-05-07 DIAGNOSIS — N18.30 CKD (CHRONIC KIDNEY DISEASE), STAGE III: ICD-10-CM

## 2019-05-07 DIAGNOSIS — Z23 NEED FOR 23-POLYVALENT PNEUMOCOCCAL POLYSACCHARIDE VACCINE: ICD-10-CM

## 2019-05-07 PROCEDURE — 99499 UNLISTED E&M SERVICE: CPT | Mod: S$GLB,,, | Performed by: PHYSICIAN ASSISTANT

## 2019-05-07 PROCEDURE — 1101F PT FALLS ASSESS-DOCD LE1/YR: CPT | Mod: CPTII,S$GLB,, | Performed by: PHYSICIAN ASSISTANT

## 2019-05-07 PROCEDURE — 99214 OFFICE O/P EST MOD 30 MIN: CPT | Mod: 25,S$GLB,, | Performed by: PHYSICIAN ASSISTANT

## 2019-05-07 PROCEDURE — 99999 PR PBB SHADOW E&M-EST. PATIENT-LVL IV: ICD-10-PCS | Mod: PBBFAC,,, | Performed by: PHYSICIAN ASSISTANT

## 2019-05-07 PROCEDURE — 90732 PPSV23 VACC 2 YRS+ SUBQ/IM: CPT | Mod: S$GLB,,, | Performed by: PHYSICIAN ASSISTANT

## 2019-05-07 PROCEDURE — 1101F PR PT FALLS ASSESS DOC 0-1 FALLS W/OUT INJ PAST YR: ICD-10-PCS | Mod: CPTII,S$GLB,, | Performed by: PHYSICIAN ASSISTANT

## 2019-05-07 PROCEDURE — 99999 PR PBB SHADOW E&M-EST. PATIENT-LVL IV: CPT | Mod: PBBFAC,,, | Performed by: PHYSICIAN ASSISTANT

## 2019-05-07 PROCEDURE — G0009 ADMIN PNEUMOCOCCAL VACCINE: HCPCS | Mod: S$GLB,,, | Performed by: PHYSICIAN ASSISTANT

## 2019-05-07 PROCEDURE — 99214 PR OFFICE/OUTPT VISIT, EST, LEVL IV, 30-39 MIN: ICD-10-PCS | Mod: 25,S$GLB,, | Performed by: PHYSICIAN ASSISTANT

## 2019-05-07 PROCEDURE — 90732 PNEUMOCOCCAL POLYSACCHARIDE VACCINE 23-VALENT =>2YO SQ IM: ICD-10-PCS | Mod: S$GLB,,, | Performed by: PHYSICIAN ASSISTANT

## 2019-05-07 PROCEDURE — G0009 PNEUMOCOCCAL POLYSACCHARIDE VACCINE 23-VALENT =>2YO SQ IM: ICD-10-PCS | Mod: S$GLB,,, | Performed by: PHYSICIAN ASSISTANT

## 2019-05-07 PROCEDURE — 99499 RISK ADDL DX/OHS AUDIT: ICD-10-PCS | Mod: S$GLB,,, | Performed by: PHYSICIAN ASSISTANT

## 2019-05-07 RX ORDER — VALSARTAN 80 MG/1
80 TABLET ORAL DAILY
Qty: 90 TABLET | Refills: 1 | Status: SHIPPED | OUTPATIENT
Start: 2019-05-07 | End: 2019-10-28 | Stop reason: SDUPTHER

## 2019-05-07 NOTE — PROGRESS NOTES
Subjective:       Patient ID: Maritza Rivas is a 89 y.o. female with multiple medical diagnoses as listed in the medical history and problem list that presents for Hypertension; Follow-up; and Medication Problem (possible side effects from recent med Avapro)  .    Chief Complaint: Hypertension; Follow-up; and Medication Problem (possible side effects from recent med Avapro)      Hypertension   This is a chronic problem. The current episode started more than 1 year ago. The problem has been gradually worsening (she wanted to get off her diovan even though hers was not on recall and has been feeling weak and pressure high since starting avapro almost 4 weeks ago. She takes the NOrvasc in the am and the Avapro at night ) since onset. Pertinent negatives include no anxiety, blurred vision, chest pain, headaches, orthopnea, palpitations, peripheral edema or shortness of breath. There are no associated agents to hypertension. Risk factors for coronary artery disease include obesity, post-menopausal state, sedentary lifestyle and diabetes mellitus. Treatments tried: norvasc 10 mg and avapro 150 mg      Review of Systems   Constitutional: Negative for chills, fatigue and fever.   Eyes: Negative for blurred vision.   Respiratory: Negative for shortness of breath.    Cardiovascular: Negative for chest pain, palpitations and orthopnea.   Neurological: Positive for weakness (extremitities ). Negative for headaches.         PAST MEDICAL HISTORY:  Past Medical History:   Diagnosis Date    Aortic stenosis     Aortic valve stenosis, critical     Carotid bruit     Carpal tunnel syndrome, bilateral     Coronary artery disease     Diabetes mellitus type II     H/O aortic valve replacement     Hypertension     Kidney insufficiency     PCP stated Stage 3    Pure hyperglyceridemia     Thyroid disease     hypothyroidism       SOCIAL HISTORY:  Social History     Socioeconomic History    Marital status:      Spouse  name: Not on file    Number of children: Not on file    Years of education: Not on file    Highest education level: Not on file   Occupational History    Not on file   Social Needs    Financial resource strain: Not on file    Food insecurity:     Worry: Not on file     Inability: Not on file    Transportation needs:     Medical: Not on file     Non-medical: Not on file   Tobacco Use    Smoking status: Never Smoker   Substance and Sexual Activity    Alcohol use: No    Drug use: No    Sexual activity: Never   Lifestyle    Physical activity:     Days per week: Not on file     Minutes per session: Not on file    Stress: Not on file   Relationships    Social connections:     Talks on phone: Not on file     Gets together: Not on file     Attends Sabianist service: Not on file     Active member of club or organization: Not on file     Attends meetings of clubs or organizations: Not on file     Relationship status: Not on file   Other Topics Concern    Not on file   Social History Narrative    Not on file       ALLERGIES AND MEDICATIONS: updated and reviewed.  Review of patient's allergies indicates:   Allergen Reactions    Avapro [irbesartan]      Weakness     Simvastatin      Leg cramps    Phenergan [promethazine] Other (See Comments)     confusion     Current Outpatient Medications   Medication Sig Dispense Refill    amLODIPine (NORVASC) 10 MG tablet Take 1 tablet (10 mg total) by mouth once daily. 90 tablet 1    aspirin (ECOTRIN) 81 MG EC tablet Take 81 mg by mouth nightly.       B12/levomefolate calcium/B-6 (FOLTX ORAL) Take by mouth.      carvedilol (COREG) 12.5 MG tablet TAKE 1 TABLET (12.5 MG TOTAL) BY MOUTH 2 (TWO) TIMES DAILY WITH MEALS. 180 tablet 1    EYE ITCH RELIEF 0.025 % (0.035 %) ophthalmic solution Place 1 drop into both eyes 2 (two) times daily.  5    ezetimibe (ZETIA) 10 mg tablet Take 10 mg by mouth every evening.  3    FOLBIC 2.5-25-2 mg Tab TAKE 1 TABLET BY MOUTH ONCE DAILY.  30 tablet 11    furosemide (LASIX) 20 MG tablet Take 20 mg by mouth twice a week.  3    lancets Misc True Metrix Lancets. CHECK SUGAR DAILY 30 each 11    levothyroxine (SYNTHROID) 75 MCG tablet TAKE 1 TABLET BY MOUTH BEFORE BREAKFAST. 30 tablet 9    MAGNESIUM ORAL Take by mouth.      pravastatin (PRAVACHOL) 20 MG tablet TAKE 1 TABLET BY MOUTH EVERY DAY 90 tablet 0    valsartan (DIOVAN) 80 MG tablet Take 1 tablet (80 mg total) by mouth once daily. 90 tablet 1    VITAMIN D2 50,000 unit capsule TAKE ONE CAPSULE BY MOUTH ONE TIME PER WEEK 12 capsule 0    blood sugar diagnostic (TRUE METRIX GLUCOSE TEST STRIP) Strp Check blood sugar DAILY 30 each 11    blood-glucose meter (TRUE METRIX GLUCOSE METER) Misc Use as directed 1 each 0     No current facility-administered medications for this visit.          Objective:   BP (!) 180/80   Pulse 69   Temp 98.1 °F (36.7 °C) (Oral)   Wt 66.5 kg (146 lb 9.7 oz)   SpO2 96%   BMI 32.29 kg/m²      Physical Exam   Constitutional: She is oriented to person, place, and time. No distress.   Cardiovascular: Normal rate and regular rhythm.   Pulmonary/Chest: Effort normal and breath sounds normal.   Neurological: She is alert and oriented to person, place, and time. She has normal strength.   Skin: Skin is warm. No erythema.           Assessment:       1. Essential hypertension    2. Need for 23-polyvalent pneumococcal polysaccharide vaccine    3. Diet-controlled diabetes mellitus    4. CKD (chronic kidney disease), stage III        Plan:       Essential hypertension  -     valsartan (DIOVAN) 80 MG tablet; Take 1 tablet (80 mg total) by mouth once daily.  Dispense: 90 tablet; Refill: 1  Offered to switch to Benicar, she is ok with going back on diovan.     Will continue Norvasc 10 mg. Restart diovan 80 mg. Follow up in 2 weeks. If still above 150/90 will increase to diovan 160 mg.      Need for 23-polyvalent pneumococcal polysaccharide vaccine  -     Pneumococcal Polysaccharide  Vaccine (23 Valent) (SQ/IM)    Diet-controlled diabetes mellitus  The current medical regimen is effective;  continue present plan and medications.    CKD (chronic kidney disease), stage III  Continue to monitor.           No follow-ups on file.

## 2019-05-23 ENCOUNTER — CLINICAL SUPPORT (OUTPATIENT)
Dept: FAMILY MEDICINE | Facility: CLINIC | Age: 84
End: 2019-05-23
Payer: MEDICARE

## 2019-05-23 VITALS — SYSTOLIC BLOOD PRESSURE: 146 MMHG | DIASTOLIC BLOOD PRESSURE: 70 MMHG

## 2019-05-23 DIAGNOSIS — I10 ESSENTIAL HYPERTENSION: Primary | ICD-10-CM

## 2019-05-23 PROCEDURE — 99999 PR PBB SHADOW E&M-EST. PATIENT-LVL I: ICD-10-PCS | Mod: PBBFAC,,,

## 2019-05-23 PROCEDURE — 99999 PR PBB SHADOW E&M-EST. PATIENT-LVL I: CPT | Mod: PBBFAC,,,

## 2019-05-23 NOTE — PROGRESS NOTES
Maritza Rivas 89 y.o. female is here today for Blood Pressure check.   History of HTN yes.    Review of patient's allergies indicates:   Allergen Reactions    Avapro [irbesartan]      Weakness     Simvastatin      Leg cramps    Phenergan [promethazine] Other (See Comments)     confusion     Creatinine   Date Value Ref Range Status   01/09/2019 1.1 0.5 - 1.4 mg/dL Final     Sodium   Date Value Ref Range Status   01/09/2019 141 136 - 145 mmol/L Final     Potassium   Date Value Ref Range Status   01/09/2019 4.6 3.5 - 5.1 mmol/L Final   ]  Patient verifies taking blood pressure medications on a regular basis at the same time of the day.     Current Outpatient Medications:     amLODIPine (NORVASC) 10 MG tablet, Take 1 tablet (10 mg total) by mouth once daily., Disp: 90 tablet, Rfl: 1    aspirin (ECOTRIN) 81 MG EC tablet, Take 81 mg by mouth nightly. , Disp: , Rfl:     B12/levomefolate calcium/B-6 (FOLTX ORAL), Take by mouth., Disp: , Rfl:     blood sugar diagnostic (TRUE METRIX GLUCOSE TEST STRIP) Strp, Check blood sugar DAILY, Disp: 30 each, Rfl: 11    blood-glucose meter (TRUE METRIX GLUCOSE METER) Misc, Use as directed, Disp: 1 each, Rfl: 0    carvedilol (COREG) 12.5 MG tablet, TAKE 1 TABLET (12.5 MG TOTAL) BY MOUTH 2 (TWO) TIMES DAILY WITH MEALS., Disp: 180 tablet, Rfl: 1    EYE ITCH RELIEF 0.025 % (0.035 %) ophthalmic solution, Place 1 drop into both eyes 2 (two) times daily., Disp: , Rfl: 5    ezetimibe (ZETIA) 10 mg tablet, Take 10 mg by mouth every evening., Disp: , Rfl: 3    FOLBIC 2.5-25-2 mg Tab, TAKE 1 TABLET BY MOUTH ONCE DAILY., Disp: 30 tablet, Rfl: 11    furosemide (LASIX) 20 MG tablet, Take 20 mg by mouth twice a week., Disp: , Rfl: 3    lancets Misc, True Metrix Lancets. CHECK SUGAR DAILY, Disp: 30 each, Rfl: 11    levothyroxine (SYNTHROID) 75 MCG tablet, TAKE 1 TABLET BY MOUTH BEFORE BREAKFAST., Disp: 30 tablet, Rfl: 9    MAGNESIUM ORAL, Take by mouth., Disp: , Rfl:     pravastatin  (PRAVACHOL) 20 MG tablet, TAKE 1 TABLET BY MOUTH EVERY DAY, Disp: 90 tablet, Rfl: 0    valsartan (DIOVAN) 80 MG tablet, Take 1 tablet (80 mg total) by mouth once daily., Disp: 90 tablet, Rfl: 1    VITAMIN D2 50,000 unit capsule, TAKE ONE CAPSULE BY MOUTH ONE TIME PER WEEK, Disp: 12 capsule, Rfl: 0  Does patient have record of home blood pressure readings yes. Readings have been averaging 130/70 today, states is always WNL.   Last dose of blood pressure medication was taken at 6am today.  Patient is asymptomatic.   Complains of no complaints.    Vitals:    05/23/19 1135 05/23/19 1142   BP: (!) 150/70 (!) 146/70   BP Location: Right arm Left arm   Patient Position: Sitting Sitting   BP Method: Large (Manual) Large (Manual)         Dr. Lowe informed of nurse visit.

## 2019-06-05 DIAGNOSIS — Z95.2 AORTIC VALVE PROSTHESIS PRESENT: ICD-10-CM

## 2019-06-05 DIAGNOSIS — I25.10 TRIPLE VESSEL CORONARY ARTERY DISEASE: Primary | ICD-10-CM

## 2019-06-14 ENCOUNTER — HOSPITAL ENCOUNTER (OUTPATIENT)
Dept: CARDIOLOGY | Facility: HOSPITAL | Age: 84
Discharge: HOME | End: 2019-06-14
Attending: INTERNAL MEDICINE
Payer: MEDICARE

## 2019-06-14 DIAGNOSIS — I25.10 TRIPLE VESSEL CORONARY ARTERY DISEASE: ICD-10-CM

## 2019-06-14 DIAGNOSIS — Z95.2 AORTIC VALVE PROSTHESIS PRESENT: ICD-10-CM

## 2019-06-14 LAB
AORTIC ROOT ANNULUS: 2.58 CM
AORTIC VALVE CUSP SEPERATION: 1.99 CM
ASCENDING AORTA: 2.78 CM
AV INDEX (PROSTH): 0.52
AV MEAN GRADIENT: 13.77 MMHG
AV PEAK GRADIENT: 26.01 MMHG
AV VALVE AREA: 1.63 CM2
AV VELOCITY RATIO: 0.53
CV ECHO LV RWT: 0.54 CM
DOP CALC AO PEAK VEL: 2.55 M/S
DOP CALC AO VTI: 58.14 CM
DOP CALC LVOT AREA: 3.11 CM2
DOP CALC LVOT DIAMETER: 1.99 CM
DOP CALC LVOT PEAK VEL: 1.35 M/S
DOP CALC LVOT STROKE VOLUME: 94.53 CM3
DOP CALCLVOT PEAK VEL VTI: 30.41 CM
E WAVE DECELERATION TIME: 240.09 MSEC
E/A RATIO: 1.17
ECHO LV POSTERIOR WALL: 0.98 CM (ref 0.6–1.1)
FRACTIONAL SHORTENING: 39 % (ref 28–44)
INTERVENTRICULAR SEPTUM: 0.98 CM (ref 0.6–1.1)
LA MAJOR: 5.94 CM
LA MINOR: 6.11 CM
LA WIDTH: 3.14 CM
LEFT ATRIUM SIZE: 4.5 CM
LEFT ATRIUM VOLUME: 72.35 CM3
LEFT INTERNAL DIMENSION IN SYSTOLE: 2.21 CM (ref 2.1–4)
LEFT VENTRICLE DIASTOLIC VOLUME: 55.11 ML
LEFT VENTRICLE SYSTOLIC VOLUME: 16.33 ML
LEFT VENTRICULAR INTERNAL DIMENSION IN DIASTOLE: 3.62 CM (ref 3.5–6)
LEFT VENTRICULAR MASS: 105.68 G
MV PEAK A VEL: 0.93 M/S
MV PEAK E VEL: 1.09 M/S
PISA TR MAX VEL: 2.43 M/S
PULM VEIN S/D RATIO: 0.89
PV PEAK D VEL: 0.46 M/S
PV PEAK S VEL: 0.41 M/S
PV PEAK VELOCITY: 0.9 CM/S
RA MAJOR: 5.21 CM
RA PRESSURE: 3 MMHG
RA WIDTH: 2.48 CM
RIGHT VENTRICULAR END-DIASTOLIC DIMENSION: 2.93 CM
RV TISSUE DOPPLER FREE WALL SYSTOLIC VELOCITY 1 (APICAL 4 CHAMBER VIEW): 5.96 M/S
SINUS: 2.74 CM
STJ: 2.33 CM
TR MAX PG: 23.62 MMHG
TRICUSPID ANNULAR PLANE SYSTOLIC EXCURSION: 1.29 CM
TV REST PULMONARY ARTERY PRESSURE: 27 MMHG

## 2019-06-14 PROCEDURE — 93306 TTE W/DOPPLER COMPLETE: CPT | Mod: 26,,, | Performed by: INTERNAL MEDICINE

## 2019-06-14 PROCEDURE — 93306 TTE W/DOPPLER COMPLETE: CPT

## 2019-06-14 PROCEDURE — 93306 TRANSTHORACIC ECHO (TTE) COMPLETE (CUPID ONLY): ICD-10-PCS | Mod: 26,,, | Performed by: INTERNAL MEDICINE

## 2019-06-26 RX ORDER — FOLIC ACID-PYRIDOXINE-CYANOCOBALAMIN TAB 2.5-25-2 MG 2.5-25-2 MG
1 TAB ORAL DAILY
Qty: 30 TABLET | Refills: 11 | Status: SHIPPED | OUTPATIENT
Start: 2019-06-26 | End: 2020-06-25

## 2019-09-20 DIAGNOSIS — E55.9 VITAMIN D DEFICIENCY: ICD-10-CM

## 2019-09-20 RX ORDER — ERGOCALCIFEROL 1.25 MG/1
CAPSULE ORAL
Qty: 12 CAPSULE | Refills: 0 | OUTPATIENT
Start: 2019-09-20

## 2019-09-20 NOTE — TELEPHONE ENCOUNTER
Return call to Pt, and informed of Provider response. Pt scheduled for lab appointment on 9-23-19.

## 2019-09-20 NOTE — TELEPHONE ENCOUNTER
Call placed to Pt, no answer. Message left on both home and mobile numbers. Pt needs to schedule lab appointment and follow up visit before refill request will be approved.

## 2019-09-23 ENCOUNTER — LAB VISIT (OUTPATIENT)
Dept: LAB | Facility: HOSPITAL | Age: 84
End: 2019-09-23
Attending: FAMILY MEDICINE
Payer: MEDICARE

## 2019-09-23 DIAGNOSIS — E11.9 DIET-CONTROLLED DIABETES MELLITUS: ICD-10-CM

## 2019-09-23 DIAGNOSIS — E03.9 HYPOTHYROIDISM, UNSPECIFIED TYPE: ICD-10-CM

## 2019-09-23 DIAGNOSIS — I11.9 BENIGN HYPERTENSIVE HEART DISEASE WITHOUT HEART FAILURE: ICD-10-CM

## 2019-09-23 DIAGNOSIS — E55.9 VITAMIN D DEFICIENCY: ICD-10-CM

## 2019-09-23 LAB
25(OH)D3+25(OH)D2 SERPL-MCNC: 43 NG/ML (ref 30–96)
ALBUMIN SERPL BCP-MCNC: 4.3 G/DL (ref 3.5–5.2)
ALP SERPL-CCNC: 90 U/L (ref 55–135)
ALT SERPL W/O P-5'-P-CCNC: 21 U/L (ref 10–44)
ANION GAP SERPL CALC-SCNC: 7 MMOL/L (ref 8–16)
AST SERPL-CCNC: 24 U/L (ref 10–40)
BILIRUB SERPL-MCNC: 1.1 MG/DL (ref 0.1–1)
BUN SERPL-MCNC: 26 MG/DL (ref 10–30)
CALCIUM SERPL-MCNC: 10.3 MG/DL (ref 8.7–10.5)
CHLORIDE SERPL-SCNC: 105 MMOL/L (ref 95–110)
CO2 SERPL-SCNC: 30 MMOL/L (ref 23–29)
CREAT SERPL-MCNC: 1.1 MG/DL (ref 0.5–1.4)
EST. GFR  (AFRICAN AMERICAN): 50 ML/MIN/1.73 M^2
EST. GFR  (NON AFRICAN AMERICAN): 43 ML/MIN/1.73 M^2
ESTIMATED AVG GLUCOSE: 126 MG/DL (ref 68–131)
GLUCOSE SERPL-MCNC: 115 MG/DL (ref 70–110)
HBA1C MFR BLD HPLC: 6 % (ref 4–5.6)
POTASSIUM SERPL-SCNC: 4.8 MMOL/L (ref 3.5–5.1)
PROT SERPL-MCNC: 7.4 G/DL (ref 6–8.4)
SODIUM SERPL-SCNC: 142 MMOL/L (ref 136–145)
TSH SERPL DL<=0.005 MIU/L-ACNC: 1.58 UIU/ML (ref 0.4–4)

## 2019-09-23 PROCEDURE — 84443 ASSAY THYROID STIM HORMONE: CPT

## 2019-09-23 PROCEDURE — 80053 COMPREHEN METABOLIC PANEL: CPT

## 2019-09-23 PROCEDURE — 82306 VITAMIN D 25 HYDROXY: CPT

## 2019-09-23 PROCEDURE — 83036 HEMOGLOBIN GLYCOSYLATED A1C: CPT

## 2019-10-28 DIAGNOSIS — I10 ESSENTIAL HYPERTENSION: ICD-10-CM

## 2019-11-08 NOTE — TELEPHONE ENCOUNTER
----- Message from Kailee Emma sent at 11/8/2019 12:37 PM CST -----  Contact: self : [phone]  .Type: RX Refill Request    Who Called:  self    Have you contacted your pharmacy: yes    Refill or New Rx: refill    RX Name and Strength:valsartan (DIOVAN) 80 MG tablet    Is this a 30 day or 90 day RX: 90 day     Preferred Pharmacy with phone number: .  [id number] - [place] - [address]  [address] [address]  Phone: [phone] Fax: [phone]    Local or Mail Order: local     Would the patient rather a call back or a response via [facility]?  Call back     Best Call Back Number: [phone]

## 2019-11-11 ENCOUNTER — CLINICAL SUPPORT (OUTPATIENT)
Dept: FAMILY MEDICINE | Facility: CLINIC | Age: 84
End: 2019-11-11
Payer: MEDICARE

## 2019-11-11 VITALS — DIASTOLIC BLOOD PRESSURE: 62 MMHG | SYSTOLIC BLOOD PRESSURE: 132 MMHG

## 2019-11-11 DIAGNOSIS — I10 BENIGN ESSENTIAL HTN: ICD-10-CM

## 2019-11-11 DIAGNOSIS — Z23 NEEDS FLU SHOT: Primary | ICD-10-CM

## 2019-11-11 PROCEDURE — 90662 IIV NO PRSV INCREASED AG IM: CPT | Mod: S$GLB,,, | Performed by: FAMILY MEDICINE

## 2019-11-11 PROCEDURE — 99499 NO LOS: ICD-10-PCS | Mod: S$GLB,,, | Performed by: FAMILY MEDICINE

## 2019-11-11 PROCEDURE — 99499 UNLISTED E&M SERVICE: CPT | Mod: S$GLB,,, | Performed by: FAMILY MEDICINE

## 2019-11-11 PROCEDURE — 90662 FLU VACCINE - HIGH DOSE (65+) PRESERVATIVE FREE IM: ICD-10-PCS | Mod: S$GLB,,, | Performed by: FAMILY MEDICINE

## 2019-11-11 PROCEDURE — G0008 ADMIN INFLUENZA VIRUS VAC: HCPCS | Mod: S$GLB,,, | Performed by: FAMILY MEDICINE

## 2019-11-11 PROCEDURE — G0008 FLU VACCINE - HIGH DOSE (65+) PRESERVATIVE FREE IM: ICD-10-PCS | Mod: S$GLB,,, | Performed by: FAMILY MEDICINE

## 2019-11-11 RX ORDER — VALSARTAN 80 MG/1
TABLET ORAL
Qty: 90 TABLET | Refills: 1 | Status: SHIPPED | OUTPATIENT
Start: 2019-11-11 | End: 2020-03-18

## 2019-11-11 NOTE — PROGRESS NOTES
Administered High Dose Influenza Vaccine 0.5mL IM to left deltoid. No s/s of any adverse reaction noted.

## 2019-11-11 NOTE — PROGRESS NOTES
Maritza Rivas 89 y.o. female is here today for Blood Pressure check.   History of HTN yes.    Review of patient's allergies indicates:   Allergen Reactions    Avapro [irbesartan]      Weakness     Simvastatin      Leg cramps    Phenergan [promethazine] Other (See Comments)     confusion     Creatinine   Date Value Ref Range Status   09/23/2019 1.1 0.5 - 1.4 mg/dL Final     Sodium   Date Value Ref Range Status   09/23/2019 142 136 - 145 mmol/L Final     Potassium   Date Value Ref Range Status   09/23/2019 4.8 3.5 - 5.1 mmol/L Final   ]  Patient verifies taking blood pressure medications on a regular basis at the same time of the day.     Current Outpatient Medications:     amLODIPine (NORVASC) 10 MG tablet, Take 1 tablet (10 mg total) by mouth once daily., Disp: 90 tablet, Rfl: 1    aspirin (ECOTRIN) 81 MG EC tablet, Take 81 mg by mouth nightly. , Disp: , Rfl:     B12/levomefolate calcium/B-6 (FOLTX ORAL), Take by mouth., Disp: , Rfl:     blood sugar diagnostic (TRUE METRIX GLUCOSE TEST STRIP) Strp, Check blood sugar DAILY, Disp: 30 each, Rfl: 11    blood-glucose meter (TRUE METRIX GLUCOSE METER) Misc, Use as directed, Disp: 1 each, Rfl: 0    carvedilol (COREG) 12.5 MG tablet, TAKE 1 TABLET (12.5 MG TOTAL) BY MOUTH 2 (TWO) TIMES DAILY WITH MEALS., Disp: 180 tablet, Rfl: 1    EYE ITCH RELIEF 0.025 % (0.035 %) ophthalmic solution, Place 1 drop into both eyes 2 (two) times daily., Disp: , Rfl: 5    ezetimibe (ZETIA) 10 mg tablet, Take 10 mg by mouth every evening., Disp: , Rfl: 3    FOLBIC 2.5-25-2 mg Tab, TAKE 1 TABLET BY MOUTH ONCE DAILY., Disp: 30 tablet, Rfl: 11    furosemide (LASIX) 20 MG tablet, Take 20 mg by mouth twice a week., Disp: , Rfl: 3    lancets Misc, True Metrix Lancets. CHECK SUGAR DAILY, Disp: 30 each, Rfl: 11    levothyroxine (SYNTHROID) 75 MCG tablet, TAKE 1 TABLET BY MOUTH BEFORE BREAKFAST., Disp: 30 tablet, Rfl: 9    MAGNESIUM ORAL, Take by mouth., Disp: , Rfl:     pravastatin  (PRAVACHOL) 20 MG tablet, TAKE 1 TABLET BY MOUTH EVERY DAY, Disp: 90 tablet, Rfl: 0    valsartan (DIOVAN) 80 MG tablet, TAKE 1 TABLET BY MOUTH EVERY DAY, Disp: 90 tablet, Rfl: 1    VITAMIN D2 50,000 unit capsule, TAKE ONE CAPSULE BY MOUTH ONE TIME PER WEEK, Disp: 12 capsule, Rfl: 0  Does patient have record of home blood pressure readings no.   Last dose of blood pressure medication was taken at 7:30am.  Patient is asymptomatic.   BP- 132/62.      ,     Dr. Lowe notified.

## 2020-03-18 DIAGNOSIS — I10 ESSENTIAL HYPERTENSION: ICD-10-CM

## 2020-03-18 RX ORDER — VALSARTAN 80 MG/1
TABLET ORAL
Qty: 90 TABLET | Refills: 1 | Status: SHIPPED | OUTPATIENT
Start: 2020-03-18

## 2020-07-14 ENCOUNTER — HOSPITAL ENCOUNTER (EMERGENCY)
Facility: HOSPITAL | Age: 85
Discharge: HOME | End: 2020-07-15
Attending: EMERGENCY MEDICINE
Payer: MEDICARE
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
## 2020-07-14 DIAGNOSIS — S52.209A ULNAR FRACTURE: ICD-10-CM

## 2020-07-14 PROCEDURE — 25000003 PHARM REV CODE 250: Performed by: EMERGENCY MEDICINE

## 2020-07-14 PROCEDURE — 99285 EMERGENCY DEPT VISIT HI MDM: CPT | Mod: 25

## 2020-07-14 PROCEDURE — 25650 CLTX ULNAR STYLOID FRACTURE: CPT | Mod: RT

## 2020-07-14 PROCEDURE — 25535 CLTX ULNAR SHFT FX W/MNPJ: CPT

## 2020-07-14 PROCEDURE — 96372 THER/PROPH/DIAG INJ SC/IM: CPT | Mod: 59

## 2020-07-14 RX ORDER — ACETAMINOPHEN 500 MG
1000 TABLET ORAL
Status: COMPLETED | OUTPATIENT
Start: 2020-07-14 | End: 2020-07-14

## 2020-07-14 RX ADMIN — ACETAMINOPHEN 1000 MG: 500 TABLET ORAL at 11:07

## 2020-07-14 RX ADMIN — CLOSTRIDIUM TETANI TOXOID ANTIGEN (FORMALDEHYDE INACTIVATED), CORYNEBACTERIUM DIPHTHERIAE TOXOID ANTIGEN (FORMALDEHYDE INACTIVATED), BORDETELLA PERTUSSIS TOXOID ANTIGEN (GLUTARALDEHYDE INACTIVATED), BORDETELLA PERTUSSIS FILAMENTOUS HEMAGGLUTININ ANTIGEN (FORMALDEHYDE INACTIVATED), BORDETELLA PERTUSSIS PERTACTIN ANTIGEN, AND BORDETELLA PERTUSSIS FIMBRIAE 2/3 ANTIGEN 0.5 ML: 5; 2; 2.5; 5; 3; 5 INJECTION, SUSPENSION INTRAMUSCULAR at 11:07

## 2020-07-15 VITALS
TEMPERATURE: 98 F | HEIGHT: 56 IN | SYSTOLIC BLOOD PRESSURE: 150 MMHG | BODY MASS INDEX: 29.02 KG/M2 | OXYGEN SATURATION: 95 % | DIASTOLIC BLOOD PRESSURE: 68 MMHG | RESPIRATION RATE: 18 BRPM | WEIGHT: 129 LBS | HEART RATE: 72 BPM

## 2020-07-15 PROCEDURE — 25535 CLTX ULNAR SHFT FX W/MNPJ: CPT

## 2020-07-15 PROCEDURE — 25000003 PHARM REV CODE 250: Performed by: EMERGENCY MEDICINE

## 2020-07-15 PROCEDURE — 63600175 PHARM REV CODE 636 W HCPCS: Performed by: EMERGENCY MEDICINE

## 2020-07-15 PROCEDURE — 90715 TDAP VACCINE 7 YRS/> IM: CPT | Performed by: EMERGENCY MEDICINE

## 2020-07-15 PROCEDURE — 90471 IMMUNIZATION ADMIN: CPT | Performed by: EMERGENCY MEDICINE

## 2020-07-15 RX ORDER — LIDOCAINE HYDROCHLORIDE AND EPINEPHRINE 20; 10 MG/ML; UG/ML
10 INJECTION, SOLUTION INFILTRATION; PERINEURAL ONCE
Status: COMPLETED | OUTPATIENT
Start: 2020-07-15 | End: 2020-07-15

## 2020-07-15 RX ORDER — OXYCODONE AND ACETAMINOPHEN 5; 325 MG/1; MG/1
1 TABLET ORAL EVERY 6 HOURS PRN
Qty: 12 TABLET | Refills: 0 | Status: SHIPPED | OUTPATIENT
Start: 2020-07-15 | End: 2022-12-27 | Stop reason: ALTCHOICE

## 2020-07-15 RX ORDER — MORPHINE SULFATE 10 MG/ML
3 INJECTION INTRAMUSCULAR; INTRAVENOUS; SUBCUTANEOUS
Status: COMPLETED | OUTPATIENT
Start: 2020-07-15 | End: 2020-07-15

## 2020-07-15 RX ADMIN — MORPHINE SULFATE 3 MG: 10 INJECTION INTRAVENOUS at 12:07

## 2020-07-15 RX ADMIN — LIDOCAINE HYDROCHLORIDE AND EPINEPHRINE 10 ML: 20; 10 INJECTION, SOLUTION INFILTRATION; PERINEURAL at 01:07

## 2020-07-15 NOTE — ED PROVIDER NOTES
Encounter Date: 7/14/2020    SCRIBE #1 NOTE: ITushar, am scribing for, and in the presence of, Zeb Vides MD.       History     Chief Complaint   Patient presents with    Arm Pain     Pt c/o R/ forearm pain secondary to a slip trip at her residence. Pt has slipped on her step she as wearing none slip resistant socks. Pt has a skin tear and some pain. No LOC     Maritza Rivas is a 89 year old female who presents to the Emergency Department complaining of right forearm pain after she slipped and fell in her home.   Patient reports the fall was mechanical fall, denies prodrome, dizziness, chest pain, numbness, weakness, headache.The patient also reports a laceration to her right forearm. She denies hitting her head. She denies any shoulder pain, headache, numbness, weakness,or visual disturbances. The patient reports that she is unaware of her last tetanus immunization.     The history is provided by the patient. No  was used.     Review of patient's allergies indicates:   Allergen Reactions    Avapro [irbesartan]      Weakness     Simvastatin      Leg cramps    Phenergan [promethazine] Other (See Comments)     confusion     Past Medical History:   Diagnosis Date    Aortic stenosis     Aortic valve stenosis, critical     Carotid bruit     Carpal tunnel syndrome, bilateral     Coronary artery disease     Diabetes mellitus type II     H/O aortic valve replacement     Hypertension     Kidney insufficiency     PCP stated Stage 3    Pure hyperglyceridemia     Thyroid disease     hypothyroidism     Past Surgical History:   Procedure Laterality Date    ADENOIDECTOMY      AORTIC VALVE REPLACEMENT  2013    CARDIAC SURGERY  2013    3 VESSEL bypass    CATARACT EXTRACTION BILATERAL W/ ANTERIOR VITRECTOMY      TONSILLECTOMY  1946    VAGINAL DELIVERY      x9 normal     Family History   Problem Relation Age of Onset    Stroke Mother     Diabetes Mother     Heart disease  Father      Social History     Tobacco Use    Smoking status: Never Smoker    Smokeless tobacco: Never Used   Substance Use Topics    Alcohol use: No    Drug use: No     Review of Systems   Constitutional: Negative for fever.   HENT: Negative for congestion.    Eyes: Negative for visual disturbance.   Respiratory: Negative for cough.    Cardiovascular: Negative for leg swelling.   Gastrointestinal: Negative for vomiting.   Musculoskeletal: Positive for arthralgias.   Skin: Positive for wound.   Neurological: Negative for weakness, numbness and headaches.   Psychiatric/Behavioral: Negative for confusion.       Physical Exam     Initial Vitals [07/14/20 2242]   BP Pulse Resp Temp SpO2   (!) 156/69 75 18 97.7 °F (36.5 °C) 95 %      MAP       --         Physical Exam    Nursing note and vitals reviewed.  Constitutional: She appears well-developed and well-nourished. She is not diaphoretic. No distress.   HENT:   Head: Normocephalic and atraumatic.   Nose: Nose normal.   Eyes: Conjunctivae and EOM are normal. Pupils are equal, round, and reactive to light. No scleral icterus.   Neck: Normal range of motion. Neck supple.   Cardiovascular: Normal rate, regular rhythm, normal heart sounds and intact distal pulses. Exam reveals no gallop and no friction rub.    No murmur heard.  Pulmonary/Chest: Breath sounds normal. No stridor. No respiratory distress. She has no wheezes. She has no rhonchi. She has no rales.   Abdominal: Soft. Normal appearance and bowel sounds are normal. She exhibits no distension. There is no abdominal tenderness. There is no rebound and no guarding.   Musculoskeletal: Normal range of motion. Tenderness and edema present.      Right forearm: She exhibits tenderness.      Comments: Tenderness to palpation to right forearm. No tenderness of anatomic snuffbox. Full ROM of the shoulder, elbow, and wrist.    Neurological: She is alert and oriented to person, place, and time. She has normal strength. No  cranial nerve deficit.   Skin: Skin is warm and dry. Laceration noted. No rash noted.   3cm skin tear to right forearm.    Psychiatric: She has a normal mood and affect. Her behavior is normal.         ED Course   Orthopedic Injury    Date/Time: 7/15/2020 4:13 AM  Performed by: Zeb Vides MD  Authorized by: Zeb Vides MD     Injury:     Injury location:  Wrist    Location details:  Right wrist    Injury type:  Fracture      Pre-procedure assessment:     Neurovascular status: Neurovascularly intact      Distal perfusion: normal      Neurological function: normal      Range of motion: normal      Local anesthesia used?: Yes      Local anesthetic:  Bupivacaine 0.25% without epinephrine    Anesthetic total (ml):  6    Patient sedated?: No        Selections made in this section will also lock the Injury type section above.:     Manipulation performed?: Yes      Skeletal traction used?: Yes      Immobilization:  Splint    Splint type:  Sugar tong  Post-procedure assessment:     Distal perfusion: normal      Neurological function: normal      Range of motion: normal         Hematoma block performed.  Fracture pieces somewhat unstable, reduction was quickly followed by movement.      Labs Reviewed - No data to display       Imaging Results          X-Ray Chest 1 View (Final result)  Result time 07/15/20 02:45:26    Final result by Russ Haines MD (07/15/20 02:45:26)                 Impression:      Chronic changes are noted without evidence for superimposed acute intrathoracic process.      Electronically signed by: Russ Haines  Date:    07/15/2020  Time:    02:45             Narrative:    EXAMINATION:  XR CHEST 1 VIEW    TECHNIQUE:  Single frontal view of the chest was performed.    COMPARISON:  July 19, 2015    FINDINGS:  Single chest view is submitted.  Postoperative change again noted.  The cardiomediastinal silhouette appears stable.  Chronic lung changes are noted.  Mild density on the  left is thought likely due to soft tissue attenuation, there is no additional radiographic evidence for confluent infiltrate or consolidation, significant pleural effusion or pneumothorax.  The osseous structures demonstrate chronic change, there is diminished mineralization and degenerative change noted.                               X-Ray Wrist Complete Right (Final result)  Result time 07/15/20 02:43:33    Final result by Russ Haines MD (07/15/20 02:43:33)                 Impression:      Acute fracture of the distal ulna again noted as above.      Electronically signed by: Russ Haines  Date:    07/15/2020  Time:    02:43             Narrative:    EXAMINATION:  XR WRIST COMPLETE 3 VIEWS RIGHT    CLINICAL HISTORY:  Unspecified fracture of shaft of unspecified ulna, initial encounter for closed fracture    TECHNIQUE:  PA, lateral, and oblique views of the right wrist were performed.    COMPARISON:  July 14, 2020    FINDINGS:  Radiographic examination of the right wrist was performed.  Overlying cast/bandage material obscures detail to some degree.  Acute fracture deformity of the distal ulna again noted, there is mild distraction/displacement again noted, appearing stable.                               CT Head Without Contrast (Final result)  Result time 07/15/20 00:11:47    Final result by Camron Espino MD (07/15/20 00:11:47)                 Impression:      No acute intracranial abnormalities identified.      Electronically signed by: Camron Espino MD  Date:    07/15/2020  Time:    00:11             Narrative:    EXAMINATION:  CT HEAD WITHOUT CONTRAST    CLINICAL HISTORY:  Head trauma, mod-severe;    TECHNIQUE:  Low dose axial images were obtained through the head.  Coronal and sagittal reformations were also performed. Contrast was not administered.    COMPARISON:  None.    FINDINGS:  There is generalized cerebral volume loss with mild chronic microvascular ischemic change.  No evidence of  acute/recent major vascular distribution cerebral infarction, intraparenchymal hemorrhage, or intra-axial space occupying lesion. The ventricular system is normal in size and configuration with no evidence of hydrocephalus. No effacement of the skull-base cisterns. No abnormal extra-axial fluid collections or blood products. Visualized paranasal sinuses and mastoid air cells are clear. The calvarium shows no significant abnormality.                               X-Ray Elbow Complete Right (Final result)  Result time 07/14/20 23:45:24    Final result by Camron Espino MD (07/14/20 23:45:24)                 Impression:      Acute displaced distal ulnar fracture.      Electronically signed by: Camron Espino MD  Date:    07/14/2020  Time:    23:45             Narrative:    EXAMINATION:  XR ELBOW COMPLETE 3 VIEW RIGHT; XR FOREARM RIGHT; XR HAND COMPLETE 3 VIEW RIGHT; XR WRIST COMPLETE 3 VIEWS RIGHT    CLINICAL HISTORY:  fall;. Unspecified fall, initial encounter    TECHNIQUE:  AP, lateral, and oblique views of the right elbow were performed.  Right forearm AP and lateral.  Right wrist three views.  Right hand three views.    COMPARISON:  None    FINDINGS:  Acute displaced fracture is seen through the distal ulnar shaft.  Distal fracture component is displaced approximately 1 cm medially with approximately 1 cm proximal migration also seen.  No additional acute displaced fractures or dislocation seen.  Mild degenerative spurring is seen involving the elbow.  No significant elbow joint effusion.  Scattered osteoarthritic changes are seen throughout the hand and wrist.  Calcification is noted at the level of the [facility].                               X-Ray Shoulder Trauma Right (Final result)  Result time 07/14/20 23:40:58    Final result by Camron Espino MD (07/14/20 23:40:58)                 Impression:      See above.      Electronically signed by: Camron Espino MD  Date:    07/14/2020  Time:    23:40              Narrative:    EXAMINATION:  XR SHOULDER TRAUMA 3 VIEW RIGHT    CLINICAL HISTORY:  Unspecified fall, initial encounter    TECHNIQUE:  Three views of the right shoulder were performed.    COMPARISON:  None    FINDINGS:  No evidence of acute displaced fracture or dislocation.  Humeral head is however high riding likely reflecting full-thickness rotator cuff pathology.  Humeral head abuts the undersurface of the acromion.  Degenerative changes and spurring are seen involving the acromioclavicular and glenohumeral joints.                               X-Ray Forearm Right (Final result)  Result time 07/14/20 23:45:24    Final result by Camron Espino MD (07/14/20 23:45:24)                 Impression:      Acute displaced distal ulnar fracture.      Electronically signed by: Camron Espino MD  Date:    07/14/2020  Time:    23:45             Narrative:    EXAMINATION:  XR ELBOW COMPLETE 3 VIEW RIGHT; XR FOREARM RIGHT; XR HAND COMPLETE 3 VIEW RIGHT; XR WRIST COMPLETE 3 VIEWS RIGHT    CLINICAL HISTORY:  fall;. Unspecified fall, initial encounter    TECHNIQUE:  AP, lateral, and oblique views of the right elbow were performed.  Right forearm AP and lateral.  Right wrist three views.  Right hand three views.    COMPARISON:  None    FINDINGS:  Acute displaced fracture is seen through the distal ulnar shaft.  Distal fracture component is displaced approximately 1 cm medially with approximately 1 cm proximal migration also seen.  No additional acute displaced fractures or dislocation seen.  Mild degenerative spurring is seen involving the elbow.  No significant elbow joint effusion.  Scattered osteoarthritic changes are seen throughout the hand and wrist.  Calcification is noted at the level of the [facility].                               X-Ray Wrist Complete Right (Final result)  Result time 07/14/20 23:45:24    Final result by Camron Espino MD (07/14/20 23:45:24)                 Impression:      Acute displaced distal ulnar  fracture.      Electronically signed by: Camron Espino MD  Date:    07/14/2020  Time:    23:45             Narrative:    EXAMINATION:  XR ELBOW COMPLETE 3 VIEW RIGHT; XR FOREARM RIGHT; XR HAND COMPLETE 3 VIEW RIGHT; XR WRIST COMPLETE 3 VIEWS RIGHT    CLINICAL HISTORY:  fall;. Unspecified fall, initial encounter    TECHNIQUE:  AP, lateral, and oblique views of the right elbow were performed.  Right forearm AP and lateral.  Right wrist three views.  Right hand three views.    COMPARISON:  None    FINDINGS:  Acute displaced fracture is seen through the distal ulnar shaft.  Distal fracture component is displaced approximately 1 cm medially with approximately 1 cm proximal migration also seen.  No additional acute displaced fractures or dislocation seen.  Mild degenerative spurring is seen involving the elbow.  No significant elbow joint effusion.  Scattered osteoarthritic changes are seen throughout the hand and wrist.  Calcification is noted at the level of the [facility].                               X-Ray Hand 3 View Right (Final result)  Result time 07/14/20 23:45:24    Final result by Camron Espino MD (07/14/20 23:45:24)                 Impression:      Acute displaced distal ulnar fracture.      Electronically signed by: Camron Espion MD  Date:    07/14/2020  Time:    23:45             Narrative:    EXAMINATION:  XR ELBOW COMPLETE 3 VIEW RIGHT; XR FOREARM RIGHT; XR HAND COMPLETE 3 VIEW RIGHT; XR WRIST COMPLETE 3 VIEWS RIGHT    CLINICAL HISTORY:  fall;. Unspecified fall, initial encounter    TECHNIQUE:  AP, lateral, and oblique views of the right elbow were performed.  Right forearm AP and lateral.  Right wrist three views.  Right hand three views.    COMPARISON:  None    FINDINGS:  Acute displaced fracture is seen through the distal ulnar shaft.  Distal fracture component is displaced approximately 1 cm medially with approximately 1 cm proximal migration also seen.  No additional acute displaced fractures or  dislocation seen.  Mild degenerative spurring is seen involving the elbow.  No significant elbow joint effusion.  Scattered osteoarthritic changes are seen throughout the hand and wrist.  Calcification is noted at the level of the [facility].                                 Medical Decision Making:   Initial Assessment:     89-year-old female presenting status post fall.  Patient notes pain in right wrist in minimal pain chest wall.  Suspect mechanical fall, patient states she remembers tripping over floor.  Neuro exam unremarkable.  Right wrist neurovascularly intact.  X-ray reveals ulnar fracture.  Attempted reduction with hematoma block, alignment remains unstable.  Overall though joint, still relatively stable with intact   Radius.  CT head neck negative.   Placed in sugar-tong splint.  Believe she is safe for discharge with Orthopedics.            Scribe Attestation:   Scribe #1: I performed the above scribed service and the documentation accurately describes the services I performed. I attest to the accuracy of the note.                          Clinical Impression:       ICD-10-CM ICD-9-CM   1. Fall  W19.XXXA E888.9   2. Ulnar fracture  S52.209A 813.82         Disposition:   Disposition: Discharged  Condition: Stable     ED Disposition Condition    Discharge Stable        ED Prescriptions     Medication Sig Dispense Start Date End Date Auth. Provider    oxyCODONE-acetaminophen (PERCOCET) 5-325 mg per tablet Take 1 tablet by mouth every 6 (six) hours as needed for Pain. Can split in half for reduced strength 12 tablet 7/15/2020  Zeb Vides MD        Follow-up Information     Follow up With Specialties Details Why Contact Info    Samantha Lowe MD Family Medicine Schedule an appointment as soon as possible for a visit   [address] [address]  [phone]      [facility] Emergency Medicine  As needed, If symptoms worsen [address]  [facility]  [id number]  [phone]    Jatin Rosado MD Orthopedic Surgery Schedule an appointment as soon as possible for a visit   [address] [facility] [place]  [address] [address]  [phone]                          I, Zeb Vides, personally performed the services described in this documentation. All medical record entries made by the scribe were at my direction and in my presence. I have reviewed the chart and agree that the record reflects my personal performance and is accurate and complete.       Zeb Vides MD  07/15/20 0416

## 2020-07-15 NOTE — ED TRIAGE NOTES
Pt presents to ED c/o R forearm pain secondary to slip and fall at home. Fell at approx 2200. Skin tear to R forearm with dressing in place.

## 2020-07-15 NOTE — DISCHARGE INSTRUCTIONS
You were seen in the emergency department after a fall.  Your exam,   CT scan and x-rays are reassuring.  You have a broken wrist and skin tear. You may also have a bruise or mild contusion.  You may feel slightly worse tomorrow.  Take anti-inflammatories or over-the-counter medication as needed.  Take Percocets if you have breakthrough pain.  This medication can make you sleepy.Please do not drink, drive, make important decisions, operate heavy machinery, or supervise children by yourself while on this medication.   Please call your orthopedist to schedule an appointment. Please follow-up for any new or worsening pain, swelling, fevers, chills, chest pain, difficulty breathing, nausea, vomiting, abdominal pain, or you become concerned in any other way.

## 2020-10-19 ENCOUNTER — PES CALL (OUTPATIENT)
Dept: ADMINISTRATIVE | Facility: CLINIC | Age: 85
End: 2020-10-19

## 2020-10-20 DIAGNOSIS — E11.9 DIET-CONTROLLED DIABETES MELLITUS: ICD-10-CM

## 2020-10-20 NOTE — TELEPHONE ENCOUNTER
Last Office Visit Info:   The patient's last visit with Samantha Lowe MD was on 4/10/2019.    The patient's last visit in current department was on Visit date not found.        Last CBC Results:   Lab Results   Component Value Date    WBC 5.53 01/09/2019    HGB 13.1 01/09/2019    HCT 39.8 01/09/2019     01/09/2019       Last CMP Results  Lab Results   Component Value Date     09/23/2019    K 4.8 09/23/2019     09/23/2019    CO2 30 (H) 09/23/2019    BUN 26 09/23/2019    CREATININE 1.1 09/23/2019    CALCIUM 10.3 09/23/2019    ALBUMIN 4.3 09/23/2019    AST 24 09/23/2019    ALT 21 09/23/2019       Last Lipids  Lab Results   Component Value Date    CHOL 166 01/09/2019    TRIG 150 01/09/2019    HDL 57 01/09/2019    LDLCALC 79.0 01/09/2019       Last A1C  Lab Results   Component Value Date    HGBA1C 6.0 (H) 09/23/2019       Last TSH  Lab Results   Component Value Date    TSH 1.578 09/23/2019         Current Med Refills  Medication List with Changes/Refills   Current Medications    AMLODIPINE (NORVASC) 10 MG TABLET    Take 1 tablet (10 mg total) by mouth once daily.       Start Date: 10/30/2018End Date: --    ASPIRIN (ECOTRIN) 81 MG EC TABLET    Take 81 mg by mouth nightly.        Start Date: --        End Date: --    B12/LEVOMEFOLATE CALCIUM/B-6 (FOLTX ORAL)    Take by mouth.       Start Date: --        End Date: --    BLOOD SUGAR DIAGNOSTIC (TRUE METRIX GLUCOSE TEST STRIP) STRP    Check blood sugar DAILY       Start Date: 4/11/2017 End Date: --    BLOOD-GLUCOSE METER (TRUE METRIX GLUCOSE METER) MISC    Use as directed       Start Date: 4/11/2017 End Date: --    CARVEDILOL (COREG) 12.5 MG TABLET    TAKE 1 TABLET (12.5 MG TOTAL) BY MOUTH 2 (TWO) TIMES DAILY WITH MEALS.       Start Date: 4/25/2018 End Date: --    EYE ITCH RELIEF 0.025 % (0.035 %) OPHTHALMIC SOLUTION    Place 1 drop into both eyes 2 (two) times daily.       Start Date: 10/10/2018End Date: --    EZETIMIBE (ZETIA) 10 MG TABLET    Take 10  mg by mouth every evening.       Start Date: 3/27/2017 End Date: --    FOLBIC 2.5-25-2 MG TAB    TAKE 1 TABLET BY MOUTH ONCE DAILY.       Start Date: 6/25/2020 End Date: --    FUROSEMIDE (LASIX) 20 MG TABLET    Take 20 mg by mouth twice a week.       Start Date: 3/27/2017 End Date: --    LANCETS MISC    True Metrix Lancets. CHECK SUGAR DAILY       Start Date: 4/11/2017 End Date: --    LEVOTHYROXINE (SYNTHROID) 75 MCG TABLET    TAKE 1 TABLET BY MOUTH BEFORE BREAKFAST.       Start Date: 4/25/2018 End Date: --    MAGNESIUM ORAL    Take by mouth.       Start Date: --        End Date: --    OXYCODONE-ACETAMINOPHEN (PERCOCET) 5-325 MG PER TABLET    Take 1 tablet by mouth every 6 (six) hours as needed for Pain. Can split in half for reduced strength       Start Date: 7/15/2020 End Date: --    PRAVASTATIN (PRAVACHOL) 20 MG TABLET    TAKE 1 TABLET BY MOUTH EVERY DAY       Start Date: 4/29/2019 End Date: --    VALSARTAN (DIOVAN) 80 MG TABLET    TAKE 1 TABLET BY MOUTH EVERY DAY       Start Date: 3/18/2020 End Date: --    VITAMIN D2 50,000 UNIT CAPSULE    TAKE ONE CAPSULE BY MOUTH ONE TIME PER WEEK       Start Date: 3/12/2019 End Date: --

## 2020-10-22 RX ORDER — LANCETS
EACH MISCELLANEOUS
Qty: 30 EACH | Refills: 11 | Status: SHIPPED | OUTPATIENT
Start: 2020-10-22

## 2020-10-22 RX ORDER — CALCIUM CITRATE/VITAMIN D3 200MG-6.25
TABLET ORAL
Qty: 30 EACH | Refills: 11 | Status: SHIPPED | OUTPATIENT
Start: 2020-10-22

## 2021-10-14 ENCOUNTER — TELEPHONE (OUTPATIENT)
Dept: FAMILY MEDICINE | Facility: CLINIC | Age: 86
End: 2021-10-14

## 2022-01-11 RX ORDER — FOLIC ACID-PYRIDOXINE-CYANOCOBALAMIN TAB 2.5-25-2 MG 2.5-25-2 MG
TAB ORAL
Qty: 90 TABLET | Refills: 0 | Status: SHIPPED | OUTPATIENT
Start: 2022-01-11 | End: 2022-12-27

## 2022-01-11 NOTE — TELEPHONE ENCOUNTER
Last Office Visit Info:   The patient's last visit with Samantha Lowe MD was on 4/10/2019.    The patient's last visit in current department was on Visit date not found.        Last CBC Results:   Lab Results   Component Value Date    WBC 5.53 01/09/2019    HGB 13.1 01/09/2019    HCT 39.8 01/09/2019     01/09/2019       Last CMP Results  Lab Results   Component Value Date     09/23/2019    K 4.8 09/23/2019     09/23/2019    CO2 30 (H) 09/23/2019    BUN 26 09/23/2019    CREATININE 1.1 09/23/2019    CALCIUM 10.3 09/23/2019    ALBUMIN 4.3 09/23/2019    AST 24 09/23/2019    ALT 21 09/23/2019       Last Lipids  Lab Results   Component Value Date    CHOL 166 01/09/2019    TRIG 150 01/09/2019    HDL 57 01/09/2019    LDLCALC 79.0 01/09/2019       Last A1C  Lab Results   Component Value Date    HGBA1C 6.0 (H) 09/23/2019       Last TSH  Lab Results   Component Value Date    TSH 1.578 09/23/2019             Current Med Refills  Medication List with Changes/Refills   Current Medications    AMLODIPINE (NORVASC) 10 MG TABLET    Take 1 tablet (10 mg total) by mouth once daily.       Start Date: 10/30/2018End Date: --    ASPIRIN (ECOTRIN) 81 MG EC TABLET    Take 81 mg by mouth nightly.        Start Date: --        End Date: --    B12/LEVOMEFOLATE CALCIUM/B-6 (FOLTX ORAL)    Take by mouth.       Start Date: --        End Date: --    BLOOD SUGAR DIAGNOSTIC (TRUE METRIX GLUCOSE TEST STRIP) STRP    Check blood sugar DAILY       Start Date: 10/22/2020End Date: --    BLOOD-GLUCOSE METER (TRUE METRIX GLUCOSE METER) MISC    Use as directed       Start Date: 4/11/2017 End Date: --    CARVEDILOL (COREG) 12.5 MG TABLET    TAKE 1 TABLET (12.5 MG TOTAL) BY MOUTH 2 (TWO) TIMES DAILY WITH MEALS.       Start Date: 4/25/2018 End Date: --    EYE ITCH RELIEF 0.025 % (0.035 %) OPHTHALMIC SOLUTION    Place 1 drop into both eyes 2 (two) times daily.       Start Date: 10/10/2018End Date: --    EZETIMIBE (ZETIA) 10 MG TABLET    Take  10 mg by mouth every evening.       Start Date: 3/27/2017 End Date: --    FOLBIC 2.5-25-2 MG TAB    TAKE 1 TABLET BY MOUTH EVERY DAY       Start Date: 6/14/2021 End Date: --    FUROSEMIDE (LASIX) 20 MG TABLET    Take 20 mg by mouth twice a week.       Start Date: 3/27/2017 End Date: --    LANCETS MISC    True Metrix Lancets. CHECK SUGAR DAILY       Start Date: 10/22/2020End Date: --    LEVOTHYROXINE (SYNTHROID) 75 MCG TABLET    TAKE 1 TABLET BY MOUTH BEFORE BREAKFAST.       Start Date: 4/25/2018 End Date: --    MAGNESIUM ORAL    Take by mouth.       Start Date: --        End Date: --    OXYCODONE-ACETAMINOPHEN (PERCOCET) 5-325 MG PER TABLET    Take 1 tablet by mouth every 6 (six) hours as needed for Pain. Can split in half for reduced strength       Start Date: 7/15/2020 End Date: --    PRAVASTATIN (PRAVACHOL) 20 MG TABLET    TAKE 1 TABLET BY MOUTH EVERY DAY       Start Date: 4/29/2019 End Date: --    VALSARTAN (DIOVAN) 80 MG TABLET    TAKE 1 TABLET BY MOUTH EVERY DAY       Start Date: 3/18/2020 End Date: --    VITAMIN D2 50,000 UNIT CAPSULE    TAKE ONE CAPSULE BY MOUTH ONE TIME PER WEEK       Start Date: 3/12/2019 End Date: --

## 2022-03-08 ENCOUNTER — PES CALL (OUTPATIENT)
Dept: ADMINISTRATIVE | Facility: CLINIC | Age: 87
End: 2022-03-08
Payer: MEDICARE

## 2022-10-20 ENCOUNTER — PES CALL (OUTPATIENT)
Dept: ADMINISTRATIVE | Facility: OTHER | Age: 87
End: 2022-10-20
Payer: MEDICARE

## 2022-12-27 ENCOUNTER — OFFICE VISIT (OUTPATIENT)
Dept: FAMILY MEDICINE | Facility: CLINIC | Age: 87
End: 2022-12-27
Payer: MEDICARE

## 2022-12-27 VITALS
HEART RATE: 77 BPM | SYSTOLIC BLOOD PRESSURE: 132 MMHG | WEIGHT: 130.06 LBS | OXYGEN SATURATION: 95 % | HEIGHT: 56 IN | BODY MASS INDEX: 29.26 KG/M2 | TEMPERATURE: 98 F | DIASTOLIC BLOOD PRESSURE: 70 MMHG

## 2022-12-27 DIAGNOSIS — E11.9 DIET-CONTROLLED DIABETES MELLITUS: ICD-10-CM

## 2022-12-27 DIAGNOSIS — I25.10 CORONARY ARTERY DISEASE INVOLVING NATIVE CORONARY ARTERY WITHOUT ANGINA PECTORIS, UNSPECIFIED WHETHER NATIVE OR TRANSPLANTED HEART: ICD-10-CM

## 2022-12-27 DIAGNOSIS — I11.9 BENIGN HYPERTENSIVE HEART DISEASE WITHOUT HEART FAILURE: ICD-10-CM

## 2022-12-27 DIAGNOSIS — R60.9 EDEMA, UNSPECIFIED TYPE: ICD-10-CM

## 2022-12-27 DIAGNOSIS — N18.31 STAGE 3A CHRONIC KIDNEY DISEASE: ICD-10-CM

## 2022-12-27 DIAGNOSIS — E07.9 THYROID DISEASE: Primary | ICD-10-CM

## 2022-12-27 PROCEDURE — 3288F FALL RISK ASSESSMENT DOCD: CPT | Mod: CPTII,S$GLB,, | Performed by: FAMILY MEDICINE

## 2022-12-27 PROCEDURE — 90694 VACC AIIV4 NO PRSRV 0.5ML IM: CPT | Mod: S$GLB,,, | Performed by: FAMILY MEDICINE

## 2022-12-27 PROCEDURE — 99499 RISK ADDL DX/OHS AUDIT: ICD-10-PCS | Mod: S$GLB,,, | Performed by: FAMILY MEDICINE

## 2022-12-27 PROCEDURE — 1101F PR PT FALLS ASSESS DOC 0-1 FALLS W/OUT INJ PAST YR: ICD-10-PCS | Mod: CPTII,S$GLB,, | Performed by: FAMILY MEDICINE

## 2022-12-27 PROCEDURE — 3288F PR FALLS RISK ASSESSMENT DOCUMENTED: ICD-10-PCS | Mod: CPTII,S$GLB,, | Performed by: FAMILY MEDICINE

## 2022-12-27 PROCEDURE — G0008 ADMIN INFLUENZA VIRUS VAC: HCPCS | Mod: S$GLB,,, | Performed by: FAMILY MEDICINE

## 2022-12-27 PROCEDURE — 99999 PR PBB SHADOW E&M-EST. PATIENT-LVL IV: CPT | Mod: PBBFAC,,, | Performed by: FAMILY MEDICINE

## 2022-12-27 PROCEDURE — G0008 FLU VACCINE - QUADRIVALENT - ADJUVANTED: ICD-10-PCS | Mod: S$GLB,,, | Performed by: FAMILY MEDICINE

## 2022-12-27 PROCEDURE — 99204 OFFICE O/P NEW MOD 45 MIN: CPT | Mod: S$GLB,,, | Performed by: FAMILY MEDICINE

## 2022-12-27 PROCEDURE — 1160F PR REVIEW ALL MEDS BY PRESCRIBER/CLIN PHARMACIST DOCUMENTED: ICD-10-PCS | Mod: CPTII,S$GLB,, | Performed by: FAMILY MEDICINE

## 2022-12-27 PROCEDURE — 1159F MED LIST DOCD IN RCRD: CPT | Mod: CPTII,S$GLB,, | Performed by: FAMILY MEDICINE

## 2022-12-27 PROCEDURE — 99204 PR OFFICE/OUTPT VISIT, NEW, LEVL IV, 45-59 MIN: ICD-10-PCS | Mod: S$GLB,,, | Performed by: FAMILY MEDICINE

## 2022-12-27 PROCEDURE — 99499 UNLISTED E&M SERVICE: CPT | Mod: S$GLB,,, | Performed by: FAMILY MEDICINE

## 2022-12-27 PROCEDURE — 1159F PR MEDICATION LIST DOCUMENTED IN MEDICAL RECORD: ICD-10-PCS | Mod: CPTII,S$GLB,, | Performed by: FAMILY MEDICINE

## 2022-12-27 PROCEDURE — 1101F PT FALLS ASSESS-DOCD LE1/YR: CPT | Mod: CPTII,S$GLB,, | Performed by: FAMILY MEDICINE

## 2022-12-27 PROCEDURE — 90694 FLU VACCINE - QUADRIVALENT - ADJUVANTED: ICD-10-PCS | Mod: S$GLB,,, | Performed by: FAMILY MEDICINE

## 2022-12-27 PROCEDURE — 99999 PR PBB SHADOW E&M-EST. PATIENT-LVL IV: ICD-10-PCS | Mod: PBBFAC,,, | Performed by: FAMILY MEDICINE

## 2022-12-27 PROCEDURE — 1160F RVW MEDS BY RX/DR IN RCRD: CPT | Mod: CPTII,S$GLB,, | Performed by: FAMILY MEDICINE

## 2022-12-27 RX ORDER — FUROSEMIDE 20 MG/1
10 TABLET ORAL
Qty: 30 TABLET | Refills: 2 | Status: SHIPPED | OUTPATIENT
Start: 2022-12-29

## 2022-12-27 NOTE — PROGRESS NOTES
Subjective:       Patient ID: Maritza Rivas is a 89 y.o. female.    Chief Complaint: Lef Leg/Ankle Swelling    89 year-old female presents to re-establish care for refills of her medications. She has hypertension and it has been well controlled. She has swelling in her legs. She has no chest pain, chest tightness , shortness of breath or palpitations.     She has swelling in her legs, bilaterally. The left leg is worse than the right. She has no orthopnea.        Past Medical History:   Diagnosis Date    Aortic stenosis     Aortic valve stenosis, critical     Carotid bruit     Carpal tunnel syndrome, bilateral     Coronary artery disease     Diabetes mellitus type II     H/O aortic valve replacement     Hypertension     Kidney insufficiency     PCP stated Stage 3    Pure hyperglyceridemia     Thyroid disease     hypothyroidism      Past Surgical History:   Procedure Laterality Date    ADENOIDECTOMY      AORTIC VALVE REPLACEMENT  2013    CARDIAC SURGERY  2013    3 VESSEL bypass    CATARACT EXTRACTION BILATERAL W/ ANTERIOR VITRECTOMY      TONSILLECTOMY  1946    VAGINAL DELIVERY      x9 normal     Family History   Problem Relation Age of Onset    Stroke Mother     Diabetes Mother     Heart disease Father      Social History     Socioeconomic History    Marital status:    Tobacco Use    Smoking status: Never    Smokeless tobacco: Never   Substance and Sexual Activity    Alcohol use: No    Drug use: No    Sexual activity: Never       Review of Systems      Objective:      Vitals:    12/27/22 1501   BP: 132/70   Pulse: 77   Temp: 98.4 °F (36.9 °C)       Physical Exam  Constitutional:       General: She is not in acute distress.     Appearance: Normal appearance. She is well-developed. She is not ill-appearing, toxic-appearing or diaphoretic.   HENT:      Head: Normocephalic and atraumatic.   Eyes:      Conjunctiva/sclera: Conjunctivae normal.   Neck:      Vascular: No carotid bruit.   Cardiovascular:      Rate  and Rhythm: Normal rate and regular rhythm.      Heart sounds: Normal heart sounds. No murmur heard.    No friction rub. No gallop.   Pulmonary:      Effort: Pulmonary effort is normal. No respiratory distress.      Breath sounds: Normal breath sounds. No stridor. No wheezing, rhonchi or rales.   Musculoskeletal:      Cervical back: Normal range of motion and neck supple.      Right lower leg: Edema present.      Left lower leg: Edema present.   Neurological:      Mental Status: She is alert and oriented to person, place, and time.   Psychiatric:         Mood and Affect: Mood normal.         Behavior: Behavior normal.       Assessment:       Problem List Items Addressed This Visit       Thyroid disease - Primary    Relevant Orders    TSH    Diet-controlled diabetes mellitus    Relevant Orders    CBC Auto Differential    Hemoglobin A1C    Comprehensive Metabolic Panel    CKD (chronic kidney disease), stage III    CAD (coronary artery disease)    Relevant Orders    CBC Auto Differential    Lipid Panel    Hemoglobin A1C    Benign hypertensive heart disease without heart failure    Relevant Orders    CBC Auto Differential    Lipid Panel    Comprehensive Metabolic Panel     Other Visit Diagnoses       Edema, unspecified type        Relevant Medications    furosemide (LASIX) 20 MG tablet (Start on 12/29/2022)              Plan:       Martiza was seen today for lef leg/ankle swelling.    Diagnoses and all orders for this visit:    Thyroid disease  -     TSH; Future  Due for labs    Diet-controlled diabetes mellitus  -     CBC Auto Differential; Future  -     Hemoglobin A1C; Future  -     Comprehensive Metabolic Panel; Future  Due for labs.   Stage 3a chronic kidney disease    Benign hypertensive heart disease without heart failure  -     CBC Auto Differential; Future  -     Lipid Panel; Future  -     Comprehensive Metabolic Panel; Future  Stable. Refilled meds and due for labs    Coronary artery disease involving native  coronary artery without angina pectoris, unspecified whether native or transplanted heart  -     CBC Auto Differential; Future  -     Lipid Panel; Future  -     Hemoglobin A1C; Future  Asymptomatic and due for labs. She is due for cardiology follow up.    Edema, unspecified type  -     furosemide (LASIX) 20 MG tablet; Take 0.5 tablets (10 mg total) by mouth twice a week.  Trial of lasix for swelling.   Other orders  -     Influenza (FLUAD) - Quadrivalent (Adjuvanted) *Preferred* (65+) (PF)

## 2022-12-29 ENCOUNTER — LAB VISIT (OUTPATIENT)
Dept: LAB | Facility: HOSPITAL | Age: 87
End: 2022-12-29
Attending: FAMILY MEDICINE
Payer: MEDICARE

## 2022-12-29 DIAGNOSIS — E11.9 DIET-CONTROLLED DIABETES MELLITUS: ICD-10-CM

## 2022-12-29 DIAGNOSIS — I11.9 BENIGN HYPERTENSIVE HEART DISEASE WITHOUT HEART FAILURE: ICD-10-CM

## 2022-12-29 DIAGNOSIS — E07.9 THYROID DISEASE: ICD-10-CM

## 2022-12-29 DIAGNOSIS — I25.10 CORONARY ARTERY DISEASE INVOLVING NATIVE CORONARY ARTERY WITHOUT ANGINA PECTORIS, UNSPECIFIED WHETHER NATIVE OR TRANSPLANTED HEART: ICD-10-CM

## 2022-12-29 LAB
ALBUMIN SERPL BCP-MCNC: 3.9 G/DL (ref 3.5–5.2)
ALP SERPL-CCNC: 95 U/L (ref 55–135)
ALT SERPL W/O P-5'-P-CCNC: 11 U/L (ref 10–44)
ANION GAP SERPL CALC-SCNC: 8 MMOL/L (ref 8–16)
AST SERPL-CCNC: 19 U/L (ref 10–40)
BASOPHILS # BLD AUTO: 0.07 K/UL (ref 0–0.2)
BASOPHILS NFR BLD: 1.3 % (ref 0–1.9)
BILIRUB SERPL-MCNC: 1.4 MG/DL (ref 0.1–1)
BUN SERPL-MCNC: 20 MG/DL (ref 10–30)
CALCIUM SERPL-MCNC: 9.8 MG/DL (ref 8.7–10.5)
CHLORIDE SERPL-SCNC: 107 MMOL/L (ref 95–110)
CHOLEST SERPL-MCNC: 154 MG/DL (ref 120–199)
CHOLEST/HDLC SERPL: 2.9 {RATIO} (ref 2–5)
CO2 SERPL-SCNC: 27 MMOL/L (ref 23–29)
CREAT SERPL-MCNC: 1.2 MG/DL (ref 0.5–1.4)
DIFFERENTIAL METHOD: NORMAL
EOSINOPHIL # BLD AUTO: 0.3 K/UL (ref 0–0.5)
EOSINOPHIL NFR BLD: 4.6 % (ref 0–8)
ERYTHROCYTE [DISTWIDTH] IN BLOOD BY AUTOMATED COUNT: 13.3 % (ref 11.5–14.5)
EST. GFR  (NO RACE VARIABLE): 41 ML/MIN/1.73 M^2
ESTIMATED AVG GLUCOSE: 111 MG/DL (ref 68–131)
GLUCOSE SERPL-MCNC: 99 MG/DL (ref 70–110)
HBA1C MFR BLD: 5.5 % (ref 4–5.6)
HCT VFR BLD AUTO: 38.6 % (ref 37–48.5)
HDLC SERPL-MCNC: 54 MG/DL (ref 40–75)
HDLC SERPL: 35.1 % (ref 20–50)
HGB BLD-MCNC: 12.5 G/DL (ref 12–16)
IMM GRANULOCYTES # BLD AUTO: 0.01 K/UL (ref 0–0.04)
IMM GRANULOCYTES NFR BLD AUTO: 0.2 % (ref 0–0.5)
LDLC SERPL CALC-MCNC: 76.2 MG/DL (ref 63–159)
LYMPHOCYTES # BLD AUTO: 1.5 K/UL (ref 1–4.8)
LYMPHOCYTES NFR BLD: 27.2 % (ref 18–48)
MCH RBC QN AUTO: 27.6 PG (ref 27–31)
MCHC RBC AUTO-ENTMCNC: 32.4 G/DL (ref 32–36)
MCV RBC AUTO: 85 FL (ref 82–98)
MONOCYTES # BLD AUTO: 0.6 K/UL (ref 0.3–1)
MONOCYTES NFR BLD: 10.9 % (ref 4–15)
NEUTROPHILS # BLD AUTO: 3 K/UL (ref 1.8–7.7)
NEUTROPHILS NFR BLD: 55.8 % (ref 38–73)
NONHDLC SERPL-MCNC: 100 MG/DL
NRBC BLD-RTO: 0 /100 WBC
PLATELET # BLD AUTO: 186 K/UL (ref 150–450)
PMV BLD AUTO: 11.3 FL (ref 9.2–12.9)
POTASSIUM SERPL-SCNC: 4.4 MMOL/L (ref 3.5–5.1)
PROT SERPL-MCNC: 7 G/DL (ref 6–8.4)
RBC # BLD AUTO: 4.53 M/UL (ref 4–5.4)
SODIUM SERPL-SCNC: 142 MMOL/L (ref 136–145)
TRIGL SERPL-MCNC: 119 MG/DL (ref 30–150)
TSH SERPL DL<=0.005 MIU/L-ACNC: 2.19 UIU/ML (ref 0.4–4)
WBC # BLD AUTO: 5.4 K/UL (ref 3.9–12.7)

## 2022-12-29 PROCEDURE — 80061 LIPID PANEL: CPT | Performed by: FAMILY MEDICINE

## 2022-12-29 PROCEDURE — 83036 HEMOGLOBIN GLYCOSYLATED A1C: CPT | Performed by: FAMILY MEDICINE

## 2022-12-29 PROCEDURE — 85025 COMPLETE CBC W/AUTO DIFF WBC: CPT | Performed by: FAMILY MEDICINE

## 2022-12-29 PROCEDURE — 84443 ASSAY THYROID STIM HORMONE: CPT | Performed by: FAMILY MEDICINE

## 2022-12-29 PROCEDURE — 36415 COLL VENOUS BLD VENIPUNCTURE: CPT | Mod: PO | Performed by: FAMILY MEDICINE

## 2022-12-29 PROCEDURE — 80053 COMPREHEN METABOLIC PANEL: CPT | Performed by: FAMILY MEDICINE

## 2023-01-05 ENCOUNTER — TELEPHONE (OUTPATIENT)
Dept: FAMILY MEDICINE | Facility: CLINIC | Age: 88
End: 2023-01-05
Payer: MEDICARE

## 2023-01-05 NOTE — TELEPHONE ENCOUNTER
----- Message from Lyn Beasley DNP sent at 1/4/2023  4:21 PM CST -----  Hi,     Please contact patient that overall her labs are within normal limits. Your blood count is normal. No anemia. Your cholesterol, thyroid, and glucose (sugar) is normal. Your kidney function is stable, we will continue to monitor your kidney function.

## 2023-01-05 NOTE — TELEPHONE ENCOUNTER
----- Message from Andreas Jordan sent at 1/5/2023  9:35 AM CST -----  Type:  Patient Returning Call    Who Called: Self     Who Left Message for Patient: Whitney     Does the patient know what this is regarding?:Yes/ Lab Results     Would the patient rather a call back or a response via [facility]? Call     Best Call Back Number: [phone] ([place]) [phone]      '

## 2023-04-11 ENCOUNTER — PES CALL (OUTPATIENT)
Dept: ADMINISTRATIVE | Facility: CLINIC | Age: 88
End: 2023-04-11
Payer: MEDICARE

## 2023-06-27 ENCOUNTER — PES CALL (OUTPATIENT)
Dept: ADMINISTRATIVE | Facility: CLINIC | Age: 88
End: 2023-06-27
Payer: MEDICARE

## 2023-06-27 NOTE — PROGRESS NOTES
Subjective:       Patient ID: Maritza Rivas is a 89 y.o. female with multiple medical diagnoses as listed in the medical history and problem list that presents for Fever (1 week) and URI  .    Chief Complaint: Fever (1 week) and URI      Fever    This is a new problem. The current episode started today (104 only checked today; did not repeat temperature and unsure if the themometer worked. Did not take meds for fever either ). Associated symptoms include congestion, coughing (dry), a sore throat (resolved ) and wheezing. Pertinent negatives include no abdominal pain, diarrhea, ear pain, headaches, nausea, urinary pain or vomiting.   URI    This is a new problem. The current episode started in the past 7 days (a week or more ). The problem has been gradually worsening. Associated symptoms include congestion, coughing (dry), rhinorrhea, sneezing, a sore throat (resolved ) and wheezing. Pertinent negatives include no abdominal pain, diarrhea, dysuria, ear pain, headaches, nausea, neck pain, sinus pain or vomiting. Treatments tried: hermilo ASH      Review of Systems   Constitutional: Positive for fatigue (today ) and fever. Negative for chills.   HENT: Positive for congestion, postnasal drip, rhinorrhea, sneezing, sore throat (resolved ) and voice change. Negative for ear pain, facial swelling, sinus pain, sinus pressure and trouble swallowing.    Eyes: Positive for discharge and itching. Negative for photophobia, pain and redness.   Respiratory: Positive for cough (dry) and wheezing. Negative for chest tightness and shortness of breath.    Gastrointestinal: Negative for abdominal pain, diarrhea, nausea and vomiting.   Genitourinary: Negative for dysuria, frequency and hematuria.   Musculoskeletal: Negative for neck pain.   Neurological: Negative for headaches.         PAST MEDICAL HISTORY:  Past Medical History:   Diagnosis Date    Aortic stenosis     Aortic valve stenosis, critical     Carotid bruit      Carpal tunnel syndrome, bilateral     Coronary artery disease     Diabetes mellitus type II     H/O aortic valve replacement     Hypertension     Kidney insufficiency     PCP stated Stage 3    Pure hyperglyceridemia     Thyroid disease     hypothyroidism       SOCIAL HISTORY:  Social History     Social History    Marital status:      Spouse name: N/A    Number of children: N/A    Years of education: N/A     Occupational History    Not on file.     Social History Main Topics    Smoking status: Never Smoker    Smokeless tobacco: Not on file    Alcohol use No    Drug use: No    Sexual activity: No     Other Topics Concern    Not on file     Social History Narrative    No narrative on file       ALLERGIES AND MEDICATIONS: updated and reviewed.  Review of patient's allergies indicates:   Allergen Reactions    Simvastatin      Leg cramps    Phenergan [promethazine] Other (See Comments)     confusion     Current Outpatient Prescriptions   Medication Sig Dispense Refill    amLODIPine (NORVASC) 10 MG tablet Take 1 tablet (10 mg total) by mouth once daily. 90 tablet 1    aspirin (ECOTRIN) 81 MG EC tablet Take 81 mg by mouth nightly.       B12/levomefolate calcium/B-6 (FOLTX ORAL) Take by mouth.      blood sugar diagnostic (TRUE METRIX GLUCOSE TEST STRIP) Strp Check blood sugar DAILY 30 each 11    blood-glucose meter (TRUE METRIX GLUCOSE METER) Misc Use as directed 1 each 0    carvedilol (COREG) 12.5 MG tablet TAKE 1 TABLET (12.5 MG TOTAL) BY MOUTH 2 (TWO) TIMES DAILY WITH MEALS. 180 tablet 1    ergocalciferol (VITAMIN D2) 50,000 unit Cap Take 1 capsule (50,000 Units total) by mouth every 7 days. 12 capsule 0    ezetimibe (ZETIA) 10 mg tablet Take 10 mg by mouth every evening.  3    folic acid-vit B6-vit B12 2.5-25-2 mg (FOLBIC) 2.5-25-2 mg Tab Take 1 tablet by mouth once daily. 30 tablet 11    lancets Misc True Metrix Lancets. CHECK SUGAR DAILY 30 each 11    levothyroxine (SYNTHROID) 75  "MCG tablet TAKE 1 TABLET BY MOUTH BEFORE BREAKFAST. 30 tablet 9    MAGNESIUM ORAL Take by mouth.      pravastatin (PRAVACHOL) 20 MG tablet Take 1 tablet (20 mg total) by mouth once daily. 90 tablet 1    valsartan (DIOVAN) 40 MG tablet Take 1 tablet (40 mg total) by mouth once daily. 90 tablet 1    furosemide (LASIX) 20 MG tablet Take 20 mg by mouth twice a week.  3     No current facility-administered medications for this visit.          Objective:   /60   Pulse 69   Temp 98.4 °F (36.9 °C) (Oral)   Resp 16   Ht 4' 8.5" (1.435 m)   Wt 67.8 kg (149 lb 7.6 oz)   SpO2 96%   BMI 32.92 kg/m²      Physical Exam   Constitutional: She is oriented to person, place, and time. She appears well-developed and well-nourished. No distress.   HENT:   Head: Normocephalic and atraumatic.   Right Ear: External ear and ear canal normal. No middle ear effusion.   Left Ear: External ear and ear canal normal.  No middle ear effusion.   Nose: No mucosal edema or rhinorrhea. Right sinus exhibits no maxillary sinus tenderness and no frontal sinus tenderness. Left sinus exhibits no maxillary sinus tenderness and no frontal sinus tenderness.   Mouth/Throat: Uvula is midline and mucous membranes are normal. No oropharyngeal exudate or posterior oropharyngeal erythema.   PND   Eyes: Conjunctivae and EOM are normal.   Cardiovascular: Normal rate and regular rhythm.    Pulmonary/Chest: Effort normal and breath sounds normal. She has no wheezes.   Negative egophony    Musculoskeletal: Normal range of motion.   Lymphadenopathy:     She has no cervical adenopathy.   Neurological: She is alert and oriented to person, place, and time.   Skin: Skin is warm.   Psychiatric: She has a normal mood and affect. Her behavior is normal.           Assessment:       1. CKD (chronic kidney disease), stage III    2. Post-nasal drip        Plan:       CKD (chronic kidney disease), stage III  -     Ambulatory referral to Nephrology  -     Cancel: " Microalbumin/creatinine urine ratio; Future; Expected date: 05/28/2018  -     Microalbumin/creatinine urine ratio; Future; Expected date: 05/28/2018    Post-nasal drip  cetrizine half daily as needed for drip in throat  Call me in two days if no improvement.       OR Call for fever 100.4 or higher or worse or new symptoms.           No Follow-up on file.   no

## 2023-10-24 ENCOUNTER — OFFICE VISIT (OUTPATIENT)
Dept: FAMILY MEDICINE | Facility: CLINIC | Age: 88
End: 2023-10-24
Payer: MEDICARE

## 2023-10-24 VITALS
TEMPERATURE: 98 F | DIASTOLIC BLOOD PRESSURE: 62 MMHG | WEIGHT: 125 LBS | BODY MASS INDEX: 28.12 KG/M2 | OXYGEN SATURATION: 96 % | HEART RATE: 70 BPM | SYSTOLIC BLOOD PRESSURE: 130 MMHG | HEIGHT: 56 IN

## 2023-10-24 DIAGNOSIS — L72.3 INFECTED SEBACEOUS CYST: Primary | ICD-10-CM

## 2023-10-24 DIAGNOSIS — L08.9 INFECTED SEBACEOUS CYST: Primary | ICD-10-CM

## 2023-10-24 PROCEDURE — 99213 PR OFFICE/OUTPT VISIT, EST, LEVL III, 20-29 MIN: ICD-10-PCS | Mod: S$GLB,,, | Performed by: NURSE PRACTITIONER

## 2023-10-24 PROCEDURE — 1159F PR MEDICATION LIST DOCUMENTED IN MEDICAL RECORD: ICD-10-PCS | Mod: CPTII,S$GLB,, | Performed by: NURSE PRACTITIONER

## 2023-10-24 PROCEDURE — 99999 PR PBB SHADOW E&M-EST. PATIENT-LVL IV: ICD-10-PCS | Mod: PBBFAC,,, | Performed by: NURSE PRACTITIONER

## 2023-10-24 PROCEDURE — 1126F AMNT PAIN NOTED NONE PRSNT: CPT | Mod: CPTII,S$GLB,, | Performed by: NURSE PRACTITIONER

## 2023-10-24 PROCEDURE — 1101F PT FALLS ASSESS-DOCD LE1/YR: CPT | Mod: CPTII,S$GLB,, | Performed by: NURSE PRACTITIONER

## 2023-10-24 PROCEDURE — 3288F PR FALLS RISK ASSESSMENT DOCUMENTED: ICD-10-PCS | Mod: CPTII,S$GLB,, | Performed by: NURSE PRACTITIONER

## 2023-10-24 PROCEDURE — 1101F PR PT FALLS ASSESS DOC 0-1 FALLS W/OUT INJ PAST YR: ICD-10-PCS | Mod: CPTII,S$GLB,, | Performed by: NURSE PRACTITIONER

## 2023-10-24 PROCEDURE — 3288F FALL RISK ASSESSMENT DOCD: CPT | Mod: CPTII,S$GLB,, | Performed by: NURSE PRACTITIONER

## 2023-10-24 PROCEDURE — 1126F PR PAIN SEVERITY QUANTIFIED, NO PAIN PRESENT: ICD-10-PCS | Mod: CPTII,S$GLB,, | Performed by: NURSE PRACTITIONER

## 2023-10-24 PROCEDURE — 99999 PR PBB SHADOW E&M-EST. PATIENT-LVL IV: CPT | Mod: PBBFAC,,, | Performed by: NURSE PRACTITIONER

## 2023-10-24 PROCEDURE — 99213 OFFICE O/P EST LOW 20 MIN: CPT | Mod: S$GLB,,, | Performed by: NURSE PRACTITIONER

## 2023-10-24 PROCEDURE — 1159F MED LIST DOCD IN RCRD: CPT | Mod: CPTII,S$GLB,, | Performed by: NURSE PRACTITIONER

## 2023-10-24 RX ORDER — DOXYCYCLINE 100 MG/1
100 CAPSULE ORAL EVERY 12 HOURS
Qty: 14 CAPSULE | Refills: 0 | Status: SHIPPED | OUTPATIENT
Start: 2023-10-24 | End: 2023-10-31

## 2023-10-24 NOTE — PROGRESS NOTES
"Subjective:       Patient ID: Maritza Rivas is a 89 y.o. female.    Chief Complaint: Cyst    HPI    Maritza Rivas is a 89 y.o. female patient that presents to clinic with a chief complaint of mass. Past medical and surgical history reviewed as listed. PCP is Samantha Lowe MD , she is  new  to me. She is accompanied by her son. Patient reports mass on right jaw line several months ago. States mass became red and tender approximately one month ago. Reports mass is tender and drains purulent drainage when palpated.     ROS as listed.    Past Medical History:   Diagnosis Date    Aortic stenosis     Aortic valve stenosis, critical     Carotid bruit     Carpal tunnel syndrome, bilateral     Coronary artery disease     Diabetes mellitus type II     H/O aortic valve replacement     Hypertension     Kidney insufficiency     PCP stated Stage 3    Pure hyperglyceridemia     Thyroid disease     hypothyroidism      Past Surgical History:   Procedure Laterality Date    ADENOIDECTOMY      AORTIC VALVE REPLACEMENT  2013    CARDIAC SURGERY  2013    3 VESSEL bypass    CATARACT EXTRACTION BILATERAL W/ ANTERIOR VITRECTOMY      TONSILLECTOMY  1946    VAGINAL DELIVERY      x9 normal      Family History   Problem Relation Age of Onset    Stroke Mother     Diabetes Mother     Heart disease Father       Review of patient's allergies indicates:   Allergen Reactions    Avapro [irbesartan]      Weakness     Simvastatin      Leg cramps    Phenergan [promethazine] Other (See Comments)     confusion     Review of Systems   Cardiovascular:  Negative for chest pain, palpitations and leg swelling.       Objective:      Vitals:    10/24/23 1155   BP: 130/62   Pulse: 70   Temp: 98 °F (36.7 °C)   TempSrc: Oral   SpO2: 96%   Weight: 56.7 kg (125 lb)   Height: 4' 8" (1.422 m)      Physical Exam  Vitals and nursing note reviewed.   Constitutional:       General: She is not in acute distress.  HENT:      Head: Normocephalic.      Jaw: " Tenderness and swelling (localized sebaceous cyst with erythema and purulence) present.   Eyes:      Conjunctiva/sclera: Conjunctivae normal.      Pupils: Pupils are equal, round, and reactive to light.   Pulmonary:      Effort: Pulmonary effort is normal. No respiratory distress.   Musculoskeletal:      Cervical back: Normal range of motion.   Skin:     General: Skin is warm and dry.   Neurological:      Mental Status: She is alert and oriented to person, place, and time.      Gait: Gait normal.   Psychiatric:         Mood and Affect: Mood normal.         Behavior: Behavior normal.         Lab Results   Component Value Date    WBC 5.40 12/29/2022    HGB 12.5 12/29/2022    HCT 38.6 12/29/2022     12/29/2022    CHOL 154 12/29/2022    TRIG 119 12/29/2022    HDL 54 12/29/2022    ALT 11 12/29/2022    AST 19 12/29/2022     12/29/2022    K 4.4 12/29/2022     12/29/2022    CREATININE 1.2 12/29/2022    BUN 20 12/29/2022    CO2 27 12/29/2022    TSH 2.189 12/29/2022    INR 1.0 07/19/2015    HGBA1C 5.5 12/29/2022      Assessment:       1. Infected sebaceous cyst        Plan:       Infected sebaceous cyst  Recommend incision and drainage.   -     Ambulatory referral/consult to General Surgery; Future; Expected date: 10/24/2023  -     doxycycline (VIBRAMYCIN) 100 MG Cap; Take 1 capsule (100 mg total) by mouth every 12 (twelve) hours. for 7 days  Dispense: 14 capsule; Refill: 0      Medication List with Changes/Refills   New Medications    DOXYCYCLINE (VIBRAMYCIN) 100 MG CAP    Take 1 capsule (100 mg total) by mouth every 12 (twelve) hours. for 7 days   Current Medications    AMLODIPINE (NORVASC) 10 MG TABLET    Take 1 tablet (10 mg total) by mouth once daily.    ASPIRIN (ECOTRIN) 81 MG EC TABLET    Take 81 mg by mouth nightly.     BLOOD SUGAR DIAGNOSTIC (TRUE METRIX GLUCOSE TEST STRIP) STRP    Check blood sugar DAILY    BLOOD-GLUCOSE METER (TRUE METRIX GLUCOSE METER) MISC    Use as directed    CARVEDILOL  (COREG) 12.5 MG TABLET    TAKE 1 TABLET (12.5 MG TOTAL) BY MOUTH 2 (TWO) TIMES DAILY WITH MEALS.    EYE ITCH RELIEF 0.025 % (0.035 %) OPHTHALMIC SOLUTION    Place 1 drop into both eyes 2 (two) times daily.    EZETIMIBE (ZETIA) 10 MG TABLET    Take 10 mg by mouth every evening.    FUROSEMIDE (LASIX) 20 MG TABLET    Take 0.5 tablets (10 mg total) by mouth twice a week.    LANCETS MISC    True Metrix Lancets. CHECK SUGAR DAILY    LEVOTHYROXINE (SYNTHROID) 75 MCG TABLET    TAKE 1 TABLET BY MOUTH BEFORE BREAKFAST.    MAGNESIUM ORAL    Take by mouth.    PRAVASTATIN (PRAVACHOL) 20 MG TABLET    TAKE 1 TABLET BY MOUTH EVERY DAY    VALSARTAN (DIOVAN) 80 MG TABLET    TAKE 1 TABLET BY MOUTH EVERY DAY    VITAMIN D2 50,000 UNIT CAPSULE    TAKE ONE CAPSULE BY MOUTH ONE TIME PER WEEK       Follow up if symptoms worsen or fail to improve.       Lyn Beasley, DNP, APRN, FNP-C  Family Medicine  [facility]

## 2023-10-25 ENCOUNTER — OFFICE VISIT (OUTPATIENT)
Dept: SURGERY | Facility: CLINIC | Age: 88
End: 2023-10-25
Payer: MEDICARE

## 2023-10-25 VITALS
SYSTOLIC BLOOD PRESSURE: 145 MMHG | HEART RATE: 65 BPM | DIASTOLIC BLOOD PRESSURE: 84 MMHG | BODY MASS INDEX: 28.02 KG/M2 | HEIGHT: 56 IN | OXYGEN SATURATION: 95 %

## 2023-10-25 DIAGNOSIS — L08.9 INFECTED SEBACEOUS CYST: ICD-10-CM

## 2023-10-25 DIAGNOSIS — L72.3 INFECTED SEBACEOUS CYST: ICD-10-CM

## 2023-10-25 PROCEDURE — 99203 OFFICE O/P NEW LOW 30 MIN: CPT | Mod: 25,S$GLB,, | Performed by: SURGERY

## 2023-10-25 PROCEDURE — 88304 TISSUE EXAM BY PATHOLOGIST: CPT | Performed by: PATHOLOGY

## 2023-10-25 PROCEDURE — 10061 INCISION & DRAINAGE: ICD-10-PCS | Mod: S$GLB,,, | Performed by: SURGERY

## 2023-10-25 PROCEDURE — 1101F PR PT FALLS ASSESS DOC 0-1 FALLS W/OUT INJ PAST YR: ICD-10-PCS | Mod: CPTII,S$GLB,, | Performed by: SURGERY

## 2023-10-25 PROCEDURE — 99203 PR OFFICE/OUTPT VISIT, NEW, LEVL III, 30-44 MIN: ICD-10-PCS | Mod: 25,S$GLB,, | Performed by: SURGERY

## 2023-10-25 PROCEDURE — 1126F AMNT PAIN NOTED NONE PRSNT: CPT | Mod: CPTII,S$GLB,, | Performed by: SURGERY

## 2023-10-25 PROCEDURE — 99999 PR PBB SHADOW E&M-EST. PATIENT-LVL IV: ICD-10-PCS | Mod: PBBFAC,,, | Performed by: SURGERY

## 2023-10-25 PROCEDURE — 3288F PR FALLS RISK ASSESSMENT DOCUMENTED: ICD-10-PCS | Mod: CPTII,S$GLB,, | Performed by: SURGERY

## 2023-10-25 PROCEDURE — 3288F FALL RISK ASSESSMENT DOCD: CPT | Mod: CPTII,S$GLB,, | Performed by: SURGERY

## 2023-10-25 PROCEDURE — 1159F PR MEDICATION LIST DOCUMENTED IN MEDICAL RECORD: ICD-10-PCS | Mod: CPTII,S$GLB,, | Performed by: SURGERY

## 2023-10-25 PROCEDURE — 88304 PR  SURG PATH,LEVEL III: ICD-10-PCS | Mod: 26,,, | Performed by: PATHOLOGY

## 2023-10-25 PROCEDURE — 99999 PR PBB SHADOW E&M-EST. PATIENT-LVL IV: CPT | Mod: PBBFAC,,, | Performed by: SURGERY

## 2023-10-25 PROCEDURE — 88304 TISSUE EXAM BY PATHOLOGIST: CPT | Mod: 26,,, | Performed by: PATHOLOGY

## 2023-10-25 PROCEDURE — 1159F MED LIST DOCD IN RCRD: CPT | Mod: CPTII,S$GLB,, | Performed by: SURGERY

## 2023-10-25 PROCEDURE — 1126F PR PAIN SEVERITY QUANTIFIED, NO PAIN PRESENT: ICD-10-PCS | Mod: CPTII,S$GLB,, | Performed by: SURGERY

## 2023-10-25 PROCEDURE — 1101F PT FALLS ASSESS-DOCD LE1/YR: CPT | Mod: CPTII,S$GLB,, | Performed by: SURGERY

## 2023-10-25 PROCEDURE — 10061 I&D ABSCESS COMP/MULTIPLE: CPT | Mod: S$GLB,,, | Performed by: SURGERY

## 2023-10-25 NOTE — PROCEDURES
"Incision & Drainage    Date/Time: 10/25/2023 11:15 AM    Performed by: Idris Doss MD  Authorized by: Idris Doss MD    Time out: Immediately prior to procedure a "time out" was called to verify the correct patient, procedure, equipment, support staff and site/side marked as required.    Consent Done?:  Yes (Written)    Type:  Cyst  Body area:  Head/neck  Location details:  Face  Anesthesia:  Local infiltration  Local anesthetic: Lidocaine 1% with epinephrine  Scalpel size:  11  Incision type:  Single straight  Incision depth: dermal    Complexity:  Complex  Drainage:  Pus  Drainage amount:  Moderate  Wound treatment:  Incision, expression of material, removal of cyst capsule and drainage  Packing material:  1/4 in gauze    I&D. The cyst capsule was manually expressed completely.packed with 1/4 iodoform gauze    "

## 2023-10-25 NOTE — H&P
History & Physical    SUBJECTIVE:     History of Present Illness:  Patient is a 89 y.o. female presents with right facial mass. Painful. Noted a week ago, grown and has become red. Occasional clear drainage.  Interested in drainage/removal. No fevers. No systemic issues. No dental/gum pain or swelling.  Not on blood thinners.  Is in a wheelchair, escorted by her son. She is alert and oriented.    Chief Complaint   Patient presents with    Cyst     Infected sebaceous        Review of patient's allergies indicates:   Allergen Reactions    Avapro [irbesartan]      Weakness     Simvastatin      Leg cramps    Phenergan [promethazine] Other (See Comments)     confusion       Current Outpatient Medications   Medication Sig Dispense Refill    amLODIPine (NORVASC) 10 MG tablet Take 1 tablet (10 mg total) by mouth once daily. 90 tablet 1    aspirin (ECOTRIN) 81 MG EC tablet Take 81 mg by mouth nightly.       blood sugar diagnostic (TRUE METRIX GLUCOSE TEST STRIP) Strp Check blood sugar DAILY 30 each 11    blood-glucose meter (TRUE METRIX GLUCOSE METER) Misc Use as directed 1 each 0    carvedilol (COREG) 12.5 MG tablet TAKE 1 TABLET (12.5 MG TOTAL) BY MOUTH 2 (TWO) TIMES DAILY WITH MEALS. 180 tablet 1    doxycycline (VIBRAMYCIN) 100 MG Cap Take 1 capsule (100 mg total) by mouth every 12 (twelve) hours. for 7 days 14 capsule 0    EYE ITCH RELIEF 0.025 % (0.035 %) ophthalmic solution Place 1 drop into both eyes 2 (two) times daily.  5    ezetimibe (ZETIA) 10 mg tablet Take 10 mg by mouth every evening.  3    furosemide (LASIX) 20 MG tablet Take 0.5 tablets (10 mg total) by mouth twice a week. 30 tablet 2    lancets Misc True Metrix Lancets. CHECK SUGAR DAILY 30 each 11    levothyroxine (SYNTHROID) 75 MCG tablet TAKE 1 TABLET BY MOUTH BEFORE BREAKFAST. 30 tablet 9    MAGNESIUM ORAL Take by mouth.      pravastatin (PRAVACHOL) 20 MG tablet TAKE 1 TABLET BY MOUTH EVERY DAY 90 tablet 0    valsartan (DIOVAN) 80 MG tablet TAKE 1 TABLET  "BY MOUTH EVERY DAY 90 tablet 1    VITAMIN D2 50,000 unit capsule TAKE ONE CAPSULE BY MOUTH ONE TIME PER WEEK 12 capsule 0     No current facility-administered medications for this visit.       Past Medical History:   Diagnosis Date    Aortic stenosis     Aortic valve stenosis, critical     Carotid bruit     Carpal tunnel syndrome, bilateral     Coronary artery disease     Diabetes mellitus type II     H/O aortic valve replacement     Hypertension     Kidney insufficiency     PCP stated Stage 3    Pure hyperglyceridemia     Thyroid disease     hypothyroidism     Past Surgical History:   Procedure Laterality Date    ADENOIDECTOMY      AORTIC VALVE REPLACEMENT  2013    CARDIAC SURGERY  2013    3 VESSEL bypass    CATARACT EXTRACTION BILATERAL W/ ANTERIOR VITRECTOMY      TONSILLECTOMY  1946    VAGINAL DELIVERY      x9 normal     Family History   Problem Relation Age of Onset    Stroke Mother     Diabetes Mother     Heart disease Father      Social History     Tobacco Use    Smoking status: Never    Smokeless tobacco: Never   Substance Use Topics    Alcohol use: No    Drug use: No        Review of Systems:  Review of Systems   Constitutional:  Negative for chills and fever.   HENT:          Right jaw pain, site of mass   Eyes: Negative.    Respiratory:  Negative for cough, chest tightness and shortness of breath.    Cardiovascular: Negative.    Gastrointestinal:  Negative for abdominal pain, blood in stool, constipation, diarrhea, nausea and vomiting.   Endocrine: Negative for cold intolerance and heat intolerance.   Genitourinary: Negative.    Musculoskeletal: Negative.    Skin: Negative.  Negative for rash.   Neurological:  Negative for dizziness, syncope and light-headedness.   Psychiatric/Behavioral:  Negative for agitation, confusion and hallucinations.        OBJECTIVE:     Vital Signs (Most Recent)  Pulse: 65 (10/25/23 1104)  BP: (!) 145/84 (10/25/23 1104)  SpO2: 95 % (10/25/23 1104)  4' 8" (1.422 m)    "     Physical Exam:  Physical Exam  Constitutional:       General: She is not in acute distress.     Appearance: She is well-developed. She is not diaphoretic.   HENT:      Head: Normocephalic and atraumatic.        Comments: Swollen mass. Cellulitis. Has a punctum and an eschar.  Eyes:      Conjunctiva/sclera: Conjunctivae normal.      Pupils: Pupils are equal, round, and reactive to light.   Cardiovascular:      Rate and Rhythm: Normal rate and regular rhythm.      Pulses: Normal pulses.      Heart sounds: Normal heart sounds.   Pulmonary:      Effort: Pulmonary effort is normal. No respiratory distress.      Breath sounds: Normal breath sounds. No stridor. No wheezing.   Abdominal:      General: Bowel sounds are normal. There is no distension.      Palpations: Abdomen is soft.      Tenderness: There is no abdominal tenderness.   Musculoskeletal:         General: Normal range of motion.      Cervical back: Normal range of motion and neck supple.   Skin:     General: Skin is warm and dry.      Findings: No rash.   Neurological:      Mental Status: She is alert and oriented to person, place, and time.      Cranial Nerves: No cranial nerve deficit.   Psychiatric:         Behavior: Behavior normal.       ASSESSMENT/PLAN:     Past Medical History:   Diagnosis Date    Aortic stenosis     Aortic valve stenosis, critical     Carotid bruit     Carpal tunnel syndrome, bilateral     Coronary artery disease     Diabetes mellitus type II     H/O aortic valve replacement     Hypertension     Kidney insufficiency     PCP stated Stage 3    Pure hyperglyceridemia     Thyroid disease     hypothyroidism       89 yr old WF w CAD, HTN, CKD3, hypothyroidism, facial cyst, infected.    PLAN:Plan     I&D and excision performed today see procedure note  Specimen to path  Continue oral antibiotics.  Remove packing tomorrow and wash wound daily.  Rtc 1 wk

## 2023-10-27 LAB
FINAL PATHOLOGIC DIAGNOSIS: NORMAL
GROSS: NORMAL
Lab: NORMAL

## 2023-11-03 ENCOUNTER — OFFICE VISIT (OUTPATIENT)
Dept: SURGERY | Facility: CLINIC | Age: 88
End: 2023-11-03
Payer: MEDICARE

## 2023-11-03 VITALS
DIASTOLIC BLOOD PRESSURE: 78 MMHG | WEIGHT: 118.63 LBS | BODY MASS INDEX: 26.59 KG/M2 | SYSTOLIC BLOOD PRESSURE: 145 MMHG

## 2023-11-03 DIAGNOSIS — L72.3 SEBACEOUS CYST: Primary | ICD-10-CM

## 2023-11-03 PROCEDURE — 99024 POSTOP FOLLOW-UP VISIT: CPT | Mod: S$GLB,,, | Performed by: SURGERY

## 2023-11-03 PROCEDURE — 1101F PT FALLS ASSESS-DOCD LE1/YR: CPT | Mod: CPTII,S$GLB,, | Performed by: SURGERY

## 2023-11-03 PROCEDURE — 99999 PR PBB SHADOW E&M-EST. PATIENT-LVL III: CPT | Mod: PBBFAC,,, | Performed by: SURGERY

## 2023-11-03 PROCEDURE — 1159F MED LIST DOCD IN RCRD: CPT | Mod: CPTII,S$GLB,, | Performed by: SURGERY

## 2023-11-03 PROCEDURE — 1126F PR PAIN SEVERITY QUANTIFIED, NO PAIN PRESENT: ICD-10-PCS | Mod: CPTII,S$GLB,, | Performed by: SURGERY

## 2023-11-03 PROCEDURE — 1101F PR PT FALLS ASSESS DOC 0-1 FALLS W/OUT INJ PAST YR: ICD-10-PCS | Mod: CPTII,S$GLB,, | Performed by: SURGERY

## 2023-11-03 PROCEDURE — 1126F AMNT PAIN NOTED NONE PRSNT: CPT | Mod: CPTII,S$GLB,, | Performed by: SURGERY

## 2023-11-03 PROCEDURE — 99999 PR PBB SHADOW E&M-EST. PATIENT-LVL III: ICD-10-PCS | Mod: PBBFAC,,, | Performed by: SURGERY

## 2023-11-03 PROCEDURE — 1159F PR MEDICATION LIST DOCUMENTED IN MEDICAL RECORD: ICD-10-PCS | Mod: CPTII,S$GLB,, | Performed by: SURGERY

## 2023-11-03 PROCEDURE — 3288F PR FALLS RISK ASSESSMENT DOCUMENTED: ICD-10-PCS | Mod: CPTII,S$GLB,, | Performed by: SURGERY

## 2023-11-03 PROCEDURE — 99024 PR POST-OP FOLLOW-UP VISIT: ICD-10-PCS | Mod: S$GLB,,, | Performed by: SURGERY

## 2023-11-03 PROCEDURE — 3288F FALL RISK ASSESSMENT DOCD: CPT | Mod: CPTII,S$GLB,, | Performed by: SURGERY

## 2023-11-03 NOTE — PROGRESS NOTES
Surgery Clinic Note    Maritza Rivas is a 89 y.o. year old female in clinic today for follow up of right facial infected cyst I&D. She feels great. There is 1 more chunk of tissue in the wound she can feel. I manually evacuated it and the cyst seems to be completely removed now.  No fevers. No pain. Occasional ongoing serous drainage  No f/c/n/v/sob/cp    ROS:  Negative except above    Pathology:  1. Right face/jaw, cyst, excision:   Acellular keritinaceous debris, suggestive of epidermal inclusion cyst     PE:  Vitals:    11/03/23 1152   BP: (!) 145/78     NAD  No belabored breathing  Right jaw: small opening, expressible cyst wall    A/P:  Maritza Rivas is a 89 y.o. year old female w infected cyst of right face/jaw    -site appears to be healing well. Cyst is hopefully completely out  -keep covered with bandaid until closed  -rtc prn    Idris Doss  General Surgery - [facility]  11/3/2023

## 2023-12-29 ENCOUNTER — LAB VISIT (OUTPATIENT)
Dept: LAB | Facility: HOSPITAL | Age: 88
End: 2023-12-29
Payer: MEDICARE

## 2023-12-29 ENCOUNTER — TELEPHONE (OUTPATIENT)
Dept: FAMILY MEDICINE | Facility: CLINIC | Age: 88
End: 2023-12-29
Payer: MEDICARE

## 2023-12-29 ENCOUNTER — OFFICE VISIT (OUTPATIENT)
Dept: FAMILY MEDICINE | Facility: CLINIC | Age: 88
End: 2023-12-29
Payer: MEDICARE

## 2023-12-29 VITALS
BODY MASS INDEX: 27.08 KG/M2 | HEART RATE: 53 BPM | RESPIRATION RATE: 16 BRPM | HEIGHT: 56 IN | OXYGEN SATURATION: 96 % | WEIGHT: 120.38 LBS | DIASTOLIC BLOOD PRESSURE: 52 MMHG | SYSTOLIC BLOOD PRESSURE: 138 MMHG | TEMPERATURE: 98 F

## 2023-12-29 DIAGNOSIS — E11.9 DIET-CONTROLLED DIABETES MELLITUS: ICD-10-CM

## 2023-12-29 DIAGNOSIS — E03.9 HYPOTHYROIDISM, UNSPECIFIED TYPE: ICD-10-CM

## 2023-12-29 DIAGNOSIS — E78.1 PURE HYPERGLYCERIDEMIA: ICD-10-CM

## 2023-12-29 DIAGNOSIS — N18.32 STAGE 3B CHRONIC KIDNEY DISEASE: Primary | ICD-10-CM

## 2023-12-29 DIAGNOSIS — I11.9 BENIGN HYPERTENSIVE HEART DISEASE WITHOUT HEART FAILURE: ICD-10-CM

## 2023-12-29 DIAGNOSIS — Z23 NEED FOR INFLUENZA VACCINATION: ICD-10-CM

## 2023-12-29 DIAGNOSIS — N18.32 STAGE 3B CHRONIC KIDNEY DISEASE: ICD-10-CM

## 2023-12-29 LAB
ALBUMIN SERPL BCP-MCNC: 4.1 G/DL (ref 3.5–5.2)
ALP SERPL-CCNC: 94 U/L (ref 55–135)
ALT SERPL W/O P-5'-P-CCNC: 17 U/L (ref 10–44)
ANION GAP SERPL CALC-SCNC: 10 MMOL/L (ref 8–16)
AST SERPL-CCNC: 23 U/L (ref 10–40)
BASOPHILS # BLD AUTO: 0.05 K/UL (ref 0–0.2)
BASOPHILS NFR BLD: 0.9 % (ref 0–1.9)
BILIRUB SERPL-MCNC: 1.1 MG/DL (ref 0.1–1)
BUN SERPL-MCNC: 16 MG/DL (ref 10–30)
CALCIUM SERPL-MCNC: 10.1 MG/DL (ref 8.7–10.5)
CHLORIDE SERPL-SCNC: 106 MMOL/L (ref 95–110)
CHOLEST SERPL-MCNC: 138 MG/DL (ref 120–199)
CHOLEST/HDLC SERPL: 3.3 {RATIO} (ref 2–5)
CO2 SERPL-SCNC: 27 MMOL/L (ref 23–29)
CREAT SERPL-MCNC: 1.1 MG/DL (ref 0.5–1.4)
DIFFERENTIAL METHOD BLD: ABNORMAL
EOSINOPHIL # BLD AUTO: 0.2 K/UL (ref 0–0.5)
EOSINOPHIL NFR BLD: 3.9 % (ref 0–8)
ERYTHROCYTE [DISTWIDTH] IN BLOOD BY AUTOMATED COUNT: 13.7 % (ref 11.5–14.5)
EST. GFR  (NO RACE VARIABLE): 46 ML/MIN/1.73 M^2
GLUCOSE SERPL-MCNC: 109 MG/DL (ref 70–110)
HCT VFR BLD AUTO: 41.1 % (ref 37–48.5)
HDLC SERPL-MCNC: 42 MG/DL (ref 40–75)
HDLC SERPL: 30.4 % (ref 20–50)
HGB BLD-MCNC: 13 G/DL (ref 12–16)
IMM GRANULOCYTES # BLD AUTO: 0.02 K/UL (ref 0–0.04)
IMM GRANULOCYTES NFR BLD AUTO: 0.4 % (ref 0–0.5)
LDLC SERPL CALC-MCNC: 73.2 MG/DL (ref 63–159)
LYMPHOCYTES # BLD AUTO: 1.5 K/UL (ref 1–4.8)
LYMPHOCYTES NFR BLD: 27.5 % (ref 18–48)
MCH RBC QN AUTO: 26.2 PG (ref 27–31)
MCHC RBC AUTO-ENTMCNC: 31.6 G/DL (ref 32–36)
MCV RBC AUTO: 83 FL (ref 82–98)
MONOCYTES # BLD AUTO: 0.5 K/UL (ref 0.3–1)
MONOCYTES NFR BLD: 9 % (ref 4–15)
NEUTROPHILS # BLD AUTO: 3.3 K/UL (ref 1.8–7.7)
NEUTROPHILS NFR BLD: 58.3 % (ref 38–73)
NONHDLC SERPL-MCNC: 96 MG/DL
NRBC BLD-RTO: 0 /100 WBC
PLATELET # BLD AUTO: 242 K/UL (ref 150–450)
PMV BLD AUTO: 11.2 FL (ref 9.2–12.9)
POTASSIUM SERPL-SCNC: 5.3 MMOL/L (ref 3.5–5.1)
PROT SERPL-MCNC: 7.4 G/DL (ref 6–8.4)
RBC # BLD AUTO: 4.96 M/UL (ref 4–5.4)
SODIUM SERPL-SCNC: 143 MMOL/L (ref 136–145)
TRIGL SERPL-MCNC: 114 MG/DL (ref 30–150)
TSH SERPL DL<=0.005 MIU/L-ACNC: 1.93 UIU/ML (ref 0.4–4)
WBC # BLD AUTO: 5.57 K/UL (ref 3.9–12.7)

## 2023-12-29 PROCEDURE — 99999 PR PBB SHADOW E&M-EST. PATIENT-LVL IV: ICD-10-PCS | Mod: PBBFAC,,, | Performed by: NURSE PRACTITIONER

## 2023-12-29 PROCEDURE — 85025 COMPLETE CBC W/AUTO DIFF WBC: CPT | Performed by: NURSE PRACTITIONER

## 2023-12-29 PROCEDURE — 80061 LIPID PANEL: CPT | Performed by: NURSE PRACTITIONER

## 2023-12-29 PROCEDURE — 1126F PR PAIN SEVERITY QUANTIFIED, NO PAIN PRESENT: ICD-10-PCS | Mod: CPTII,S$GLB,, | Performed by: NURSE PRACTITIONER

## 2023-12-29 PROCEDURE — G0008 FLU VACCINE - QUADRIVALENT - ADJUVANTED: ICD-10-PCS | Mod: S$GLB,,, | Performed by: NURSE PRACTITIONER

## 2023-12-29 PROCEDURE — 99999 PR PBB SHADOW E&M-EST. PATIENT-LVL IV: CPT | Mod: PBBFAC,,, | Performed by: NURSE PRACTITIONER

## 2023-12-29 PROCEDURE — 3288F PR FALLS RISK ASSESSMENT DOCUMENTED: ICD-10-PCS | Mod: CPTII,S$GLB,, | Performed by: NURSE PRACTITIONER

## 2023-12-29 PROCEDURE — 90694 FLU VACCINE - QUADRIVALENT - ADJUVANTED: ICD-10-PCS | Mod: S$GLB,,, | Performed by: NURSE PRACTITIONER

## 2023-12-29 PROCEDURE — 1159F MED LIST DOCD IN RCRD: CPT | Mod: CPTII,S$GLB,, | Performed by: NURSE PRACTITIONER

## 2023-12-29 PROCEDURE — 1101F PR PT FALLS ASSESS DOC 0-1 FALLS W/OUT INJ PAST YR: ICD-10-PCS | Mod: CPTII,S$GLB,, | Performed by: NURSE PRACTITIONER

## 2023-12-29 PROCEDURE — 1160F PR REVIEW ALL MEDS BY PRESCRIBER/CLIN PHARMACIST DOCUMENTED: ICD-10-PCS | Mod: CPTII,S$GLB,, | Performed by: NURSE PRACTITIONER

## 2023-12-29 PROCEDURE — 1160F RVW MEDS BY RX/DR IN RCRD: CPT | Mod: CPTII,S$GLB,, | Performed by: NURSE PRACTITIONER

## 2023-12-29 PROCEDURE — 1101F PT FALLS ASSESS-DOCD LE1/YR: CPT | Mod: CPTII,S$GLB,, | Performed by: NURSE PRACTITIONER

## 2023-12-29 PROCEDURE — 99214 PR OFFICE/OUTPT VISIT, EST, LEVL IV, 30-39 MIN: ICD-10-PCS | Mod: 25,S$GLB,, | Performed by: NURSE PRACTITIONER

## 2023-12-29 PROCEDURE — 99214 OFFICE O/P EST MOD 30 MIN: CPT | Mod: 25,S$GLB,, | Performed by: NURSE PRACTITIONER

## 2023-12-29 PROCEDURE — 90694 VACC AIIV4 NO PRSRV 0.5ML IM: CPT | Mod: S$GLB,,, | Performed by: NURSE PRACTITIONER

## 2023-12-29 PROCEDURE — 1159F PR MEDICATION LIST DOCUMENTED IN MEDICAL RECORD: ICD-10-PCS | Mod: CPTII,S$GLB,, | Performed by: NURSE PRACTITIONER

## 2023-12-29 PROCEDURE — 83036 HEMOGLOBIN GLYCOSYLATED A1C: CPT | Performed by: NURSE PRACTITIONER

## 2023-12-29 PROCEDURE — 36415 COLL VENOUS BLD VENIPUNCTURE: CPT | Mod: PN | Performed by: NURSE PRACTITIONER

## 2023-12-29 PROCEDURE — G0008 ADMIN INFLUENZA VIRUS VAC: HCPCS | Mod: S$GLB,,, | Performed by: NURSE PRACTITIONER

## 2023-12-29 PROCEDURE — 1126F AMNT PAIN NOTED NONE PRSNT: CPT | Mod: CPTII,S$GLB,, | Performed by: NURSE PRACTITIONER

## 2023-12-29 PROCEDURE — 80053 COMPREHEN METABOLIC PANEL: CPT | Performed by: NURSE PRACTITIONER

## 2023-12-29 PROCEDURE — 84443 ASSAY THYROID STIM HORMONE: CPT | Performed by: NURSE PRACTITIONER

## 2023-12-29 PROCEDURE — 3288F FALL RISK ASSESSMENT DOCD: CPT | Mod: CPTII,S$GLB,, | Performed by: NURSE PRACTITIONER

## 2023-12-29 NOTE — PROGRESS NOTES
Routine Office Visit    Patient Name: Maritza Rivas    : 1926  MRN: [id number]    Chief Complaint:  Lab work, diarrhea    Subjective:  Maritza is a 89 y.o. female who presents today for:    Lab work, diarrhea - patient who is new to me with the below documented medical history, heart valve replacement reports today for evaluation with her son.  Apparently 2 weeks ago was having some mild diarrhea which has resolved.  Her son was concerned that she had the flu but she did not have any upper or lower respiratory symptoms or myalgias or fevers.  She feels well in her usual state of health today.  Her son would like for her to get some lab work.  Her medications have been reviewed and are in her chart.  She reports compliance with everything.  She states that she does not need any refills.  She denies other acute problems or concerns.    Past Medical History  Past Medical History:   Diagnosis Date    Aortic stenosis     Aortic valve stenosis, critical     Carotid bruit     Carpal tunnel syndrome, bilateral     Coronary artery disease     Diabetes mellitus type II     H/O aortic valve replacement     Hypertension     Kidney insufficiency     PCP stated Stage 3    Pure hyperglyceridemia     Thyroid disease     hypothyroidism       Family History  Family History   Problem Relation Age of Onset    Stroke Mother     Diabetes Mother     Heart disease Father        Current Medications  Current Outpatient Medications on File Prior to Visit   Medication Sig Dispense Refill    amLODIPine (NORVASC) 10 MG tablet Take 1 tablet (10 mg total) by mouth once daily. 90 tablet 1    aspirin (ECOTRIN) 81 MG EC tablet Take 81 mg by mouth nightly.       blood sugar diagnostic (TRUE METRIX GLUCOSE TEST STRIP) Strp Check blood sugar DAILY 30 each 11    blood-glucose meter (TRUE METRIX GLUCOSE METER) Misc Use as directed 1 each 0    carvedilol (COREG) 12.5 MG tablet TAKE 1 TABLET (12.5 MG TOTAL) BY MOUTH 2 (TWO) TIMES DAILY WITH MEALS.  180 tablet 1    EYE ITCH RELIEF 0.025 % (0.035 %) ophthalmic solution Place 1 drop into both eyes 2 (two) times daily.  5    ezetimibe (ZETIA) 10 mg tablet Take 10 mg by mouth every evening.  3    lancets Misc True Metrix Lancets. CHECK SUGAR DAILY 30 each 11    levothyroxine (SYNTHROID) 75 MCG tablet TAKE 1 TABLET BY MOUTH BEFORE BREAKFAST. 30 tablet 9    MAGNESIUM ORAL Take by mouth.      pravastatin (PRAVACHOL) 20 MG tablet TAKE 1 TABLET BY MOUTH EVERY DAY 90 tablet 0    valsartan (DIOVAN) 80 MG tablet TAKE 1 TABLET BY MOUTH EVERY DAY 90 tablet 1    VITAMIN D2 50,000 unit capsule TAKE ONE CAPSULE BY MOUTH ONE TIME PER WEEK 12 capsule 0    furosemide (LASIX) 20 MG tablet Take 0.5 tablets (10 mg total) by mouth twice a week. (Patient not taking: Reported on 12/29/2023) 30 tablet 2    [DISCONTINUED] metoprolol succinate (TOPROL XL) 50 MG 24 hr tablet Take 1 tablet (50 mg total) by mouth once daily. 30 tablet 11     No current facility-administered medications on file prior to visit.       Allergies   Review of patient's allergies indicates:   Allergen Reactions    Avapro [irbesartan]      Weakness     Simvastatin      Leg cramps    Phenergan [promethazine] Other (See Comments)     confusion       Review of Systems (Pertinent positives)  Review of Systems   Constitutional: Negative.  Negative for chills and fever.   HENT: Negative.  Negative for congestion, sinus pain and sore throat.    Eyes: Negative.    Respiratory:  Negative for cough, shortness of breath and wheezing.    Cardiovascular:  Negative for chest pain, palpitations, orthopnea and claudication.   Gastrointestinal: Negative.  Negative for abdominal pain, diarrhea (resolved), nausea and vomiting.   Genitourinary: Negative.  Negative for dysuria, frequency and urgency.   Musculoskeletal: Negative.  Negative for back pain, joint pain and neck pain.   Skin: Negative.    Neurological: Negative.  Negative for dizziness, tingling, loss of consciousness and  "headaches.   Endo/Heme/Allergies: Negative.    Psychiatric/Behavioral: Negative.         BP (!) 138/52 (BP Location: Left arm, Patient Position: Sitting, BP Method: Medium (Manual))   Pulse (!) 53   Temp 97.6 °F (36.4 °C) (Oral)   Resp 16   Ht 4' 8" (1.422 m)   Wt 54.6 kg (120 lb 5.9 oz)   SpO2 96%   BMI 26.99 kg/m²     Physical Exam  Vitals reviewed.   Constitutional:       General: She is not in acute distress.     Appearance: Normal appearance. She is not ill-appearing, toxic-appearing or diaphoretic.   HENT:      Head: Normocephalic and atraumatic.   Cardiovascular:      Rate and Rhythm: Normal rate and regular rhythm.      Pulses: Normal pulses.      Heart sounds: Normal heart sounds.   Pulmonary:      Effort: Pulmonary effort is normal. No respiratory distress.      Breath sounds: Normal breath sounds. No wheezing.   Abdominal:      General: Bowel sounds are normal. There is no distension.      Palpations: Abdomen is soft.      Tenderness: There is no abdominal tenderness.   Musculoskeletal:         General: No swelling, tenderness or deformity. Normal range of motion.   Skin:     General: Skin is warm and dry.      Capillary Refill: Capillary refill takes less than 2 seconds.   Neurological:      General: No focal deficit present.      Mental Status: She is alert and oriented to person, place, and time.   Psychiatric:         Mood and Affect: Mood normal.         Behavior: Behavior normal.          Assessment/Plan:  Maritza Rivas is a 89 y.o. female who presents today for :    Maritza was seen today for gi problem.    Diagnoses and all orders for this visit:    Stage 3b chronic kidney disease  -     COMPREHENSIVE METABOLIC PANEL; Future    Hypothyroidism, unspecified type  -     TSH; Future    Diet-controlled diabetes mellitus  -     HEMOGLOBIN A1C; Future    Benign hypertensive heart disease without heart failure  -     CBC W/ AUTO DIFFERENTIAL; Future  -     COMPREHENSIVE METABOLIC PANEL; " Future  -     Lipid Panel; Future    Pure hyperglyceridemia  -     Lipid Panel; Future    Need for influenza vaccination  -     Influenza (FLUAD) - Quadrivalent (Adjuvanted) *Preferred* (65+) (PF)    - diarrhea resolved.  Normal examination today  - flu shot given  - labs ordered.  Patient states that she does not need any refills of her meds right now  - offered referral to cardiology for monitoring of her heart disease, but patient declines  - follow-up PCP 6 months        This office note has been dictated.  This dictation has been generated using M-Modal Fluency Direct dictation; some phonetic errors may occur.

## 2023-12-30 LAB
ESTIMATED AVG GLUCOSE: 108 MG/DL (ref 68–131)
HBA1C MFR BLD: 5.4 % (ref 4–5.6)

## 2024-01-03 ENCOUNTER — TELEPHONE (OUTPATIENT)
Dept: FAMILY MEDICINE | Facility: CLINIC | Age: 89
End: 2024-01-03
Payer: MEDICARE

## 2024-01-03 NOTE — TELEPHONE ENCOUNTER
----- Message from Dale Morales NP sent at 1/2/2024  8:14 AM CST -----  Please call patient and let her know that everything looks good on her lab work.  Thank you

## 2024-02-12 RX ORDER — CARVEDILOL 12.5 MG/1
TABLET ORAL
Qty: 180 TABLET | Refills: 1 | Status: SHIPPED | OUTPATIENT
Start: 2024-02-12

## 2024-02-12 NOTE — TELEPHONE ENCOUNTER
----- Message from Leda Greer sent at 2/12/2024 10:59 AM CST -----  Regarding: Tiffany  Type: RX Refill Request       Who Called: Tiffany daughter in law       Have you contacted your pharmacy: no       Refill or New Rx: refill       RX Name and Strength: carvedilol (COREG) 12.5 MG      More info: pt daughter in law pt will only need 2-3 week of med until she sees her cardio (Dr. Landers)         Preferred Pharmacy with phone number:  [facility]/pharmacy #[id number] - [place] - [address]  [address] [address]  Phone: [phone] Fax: [phone]      Local or Mail Order: local         Would the patient rather a call back or a response via [facility]? Call back [phone]  '

## 2024-02-12 NOTE — TELEPHONE ENCOUNTER
----- Message from Leda Greer sent at 2/12/2024 10:59 AM CST -----  Regarding: Tiffany  Type: RX Refill Request       Who Called: Tiffany daughter in law       Have you contacted your pharmacy: no       Refill or New Rx: refill       RX Name and Strength: carvedilol (COREG) 12.5 MG      More info: pt daughter in law pt will only need 2-3 week of med until she sees her cardio (Dr. Landers)         Preferred Pharmacy with phone number:  [facility]/pharmacy #[id number] - [place] - [address]  [address] [address]  Phone: [phone] Fax: [phone]      Local or Mail Order: local         Would the patient rather a call back or a response via [facility]? Call back [phone]  '

## 2024-02-12 NOTE — TELEPHONE ENCOUNTER
----- Message from Leda Greer sent at 2/12/2024 10:59 AM CST -----  Regarding: Tiffany  Type: RX Refill Request       Who Called: Tiffany daughter in law       Have you contacted your pharmacy: no       Refill or New Rx: refill       RX Name and Strength: carvedilol (COREG) 12.5 MG      More info: pt daughter in law pt will only need 2-3 week of med until she sees her cardio (Dr. Landers)         Preferred Pharmacy with phone number:  [facility]/pharmacy #[id number] - [place], [place] - [address]  [address] [address]  Phone: [phone] Fax: [phone]      Local or Mail Order: melvi Greer, Leda MITTAL Staff  Caller: Unspecified (Today, 10:55 AM)  Type:  Patient Requesting Referral    Who Called: Tiffany daughter in law    Referral to What Specialty: Cardio    Reason for Referral: extend care with same Cardio    Does the patient want the referral with a specific physician?: Dr. Sheng Landers    Is the specialist an [facility]?: [facility]    Would the patient rather a call back or a response via [facility]?  Call back    Best Call Back Number:[phone]    Additional Information:  pt daughter in law calling stated that pt need referral to extend her care with cardio being that he moved to another location can be fax over to [phone] R        Would the patient rather a call back or a response via [facility]? Call back [phone]  '

## 2024-02-12 NOTE — TELEPHONE ENCOUNTER
No care due was identified.  Health [facility] Embedded Care Due Messages. Reference number: [id number].   2/12/2024 11:05:39 AM CST

## 2024-02-20 ENCOUNTER — OFFICE VISIT (OUTPATIENT)
Dept: FAMILY MEDICINE | Facility: CLINIC | Age: 89
End: 2024-02-20
Payer: MEDICARE

## 2024-02-20 VITALS
HEART RATE: 72 BPM | TEMPERATURE: 98 F | SYSTOLIC BLOOD PRESSURE: 122 MMHG | DIASTOLIC BLOOD PRESSURE: 60 MMHG | HEIGHT: 56 IN | OXYGEN SATURATION: 95 % | WEIGHT: 121.06 LBS | BODY MASS INDEX: 27.23 KG/M2

## 2024-02-20 DIAGNOSIS — L30.9 DERMATITIS: ICD-10-CM

## 2024-02-20 DIAGNOSIS — Z09 HOSPITAL DISCHARGE FOLLOW-UP: Primary | ICD-10-CM

## 2024-02-20 DIAGNOSIS — I25.10 CORONARY ARTERY DISEASE INVOLVING NATIVE CORONARY ARTERY WITHOUT ANGINA PECTORIS, UNSPECIFIED WHETHER NATIVE OR TRANSPLANTED HEART: ICD-10-CM

## 2024-02-20 DIAGNOSIS — F03.90 DEMENTIA, UNSPECIFIED DEMENTIA SEVERITY, UNSPECIFIED DEMENTIA TYPE, UNSPECIFIED WHETHER BEHAVIORAL, PSYCHOTIC, OR MOOD DISTURBANCE OR ANXIETY: ICD-10-CM

## 2024-02-20 DIAGNOSIS — N39.0 ACUTE UTI: ICD-10-CM

## 2024-02-20 PROCEDURE — 99999 PR PBB SHADOW E&M-EST. PATIENT-LVL IV: CPT | Mod: PBBFAC,,, | Performed by: FAMILY MEDICINE

## 2024-02-20 PROCEDURE — 99214 OFFICE O/P EST MOD 30 MIN: CPT | Mod: S$GLB,,, | Performed by: FAMILY MEDICINE

## 2024-02-20 RX ORDER — MEMANTINE HYDROCHLORIDE 5 MG/1
5 TABLET ORAL DAILY
Qty: 30 TABLET | Refills: 11 | Status: SHIPPED | OUTPATIENT
Start: 2024-02-20 | End: 2025-02-19

## 2024-02-20 RX ORDER — TRIAMCINOLONE ACETONIDE 1 MG/G
CREAM TOPICAL 2 TIMES DAILY
Qty: 30 G | Refills: 1 | Status: SHIPPED | OUTPATIENT
Start: 2024-02-20

## 2024-02-20 RX ORDER — CEPHALEXIN 250 MG/1
250 CAPSULE ORAL EVERY 12 HOURS
Qty: 14 CAPSULE | Refills: 0 | Status: SHIPPED | OUTPATIENT
Start: 2024-02-20 | End: 2024-02-27

## 2024-02-20 NOTE — PATIENT INSTRUCTIONS
Keflex for the uti--- twice a day for a week    Namenda for her memory. Will talk to cardiology first      Triamcinolone cream for rash

## 2024-02-20 NOTE — PROGRESS NOTES
Subjective     Patient ID: Maritza Rivas is a 89 y.o. female.    Chief Complaint: Hospital Follow Up (Went to [facility] on 2/7 for altered mental status. Discharged on 2/8)    89 year old here for hsoptial follow up. She had altered mental status.    She is doing better now. She is progressing and becoming more active. She is moving around very well. The family has moved in with her. Her son and daughter in law moved in as well. She has memory issues as well.       Hospital Course: per admitting:   Maritza Rivas is a/an 89 y.o. female with pmhx as listed below but significant for HTN, HLD who presents to the hospital at the behest of her children as she is noted to have altered mental status, noted as not incoherent speaking with word finding difficulty, difficulty understanding, and possible hallucinations. The family notes that she has dementia but that this is more abnormal than usual. The patient denies any pain. There have been no new additions of medications nor any drug use. She has no signs/symptoms of infection. Hospital medicine has been consulted to assist with further evaluation.   Course: CT head and MRI Brain w/wo is negative for acute stroke. The patient has no evidence of infection and feels back to her normal self. This may have been an acute delirium and progression of her dementia. I have requested that the family follow up with her pcp for further monitoring and evaluation. There are no acute events overnight. The patient and family agrees that the patient is stable for discharge home with the appropriate outpatient follow up.         Past Medical History:   Diagnosis Date    Aortic stenosis     Aortic valve stenosis, critical     Carotid bruit     Carpal tunnel syndrome, bilateral     Coronary artery disease     Diabetes mellitus type II     H/O aortic valve replacement     Hypertension     Kidney insufficiency     PCP stated Stage 3    Pure hyperglyceridemia     Thyroid disease      "hypothyroidism      Past Surgical History:   Procedure Laterality Date    ADENOIDECTOMY      AORTIC VALVE REPLACEMENT  2013    CARDIAC SURGERY  2013    3 VESSEL bypass    CATARACT EXTRACTION BILATERAL W/ ANTERIOR VITRECTOMY      TONSILLECTOMY  1946    VAGINAL DELIVERY      x9 normal     Family History   Problem Relation Age of Onset    Stroke Mother     Diabetes Mother     Heart disease Father      Social History     Socioeconomic History    Marital status:    Tobacco Use    Smoking status: Never    Smokeless tobacco: Never   Substance and Sexual Activity    Alcohol use: No    Drug use: No    Sexual activity: Never       Review of Systems   Constitutional:  Negative for chills and fever.   Respiratory:  Negative for cough, chest tightness, shortness of breath and wheezing.    Cardiovascular:  Negative for chest pain.   Gastrointestinal:  Negative for abdominal pain, nausea and vomiting.   Genitourinary:  Positive for nocturia.          Objective   Vitals:    02/20/24 1414   BP: 122/60   Pulse: 72   Temp: 98.1 °F (36.7 °C)   TempSrc: Oral   SpO2: 95%   Weight: 54.9 kg (121 lb 0.5 oz)   Height: 4' 8" (1.422 m)       Physical Exam  Constitutional:       General: She is not in acute distress.     Appearance: Normal appearance. She is well-developed. She is not ill-appearing, toxic-appearing or diaphoretic.   HENT:      Head: Normocephalic and atraumatic.   Eyes:      Conjunctiva/sclera: Conjunctivae normal.   Neck:      Vascular: No carotid bruit.   Cardiovascular:      Rate and Rhythm: Normal rate and regular rhythm.      Heart sounds: Normal heart sounds. No murmur heard.     No friction rub. No gallop.   Pulmonary:      Effort: Pulmonary effort is normal. No respiratory distress.      Breath sounds: Normal breath sounds. No stridor. No wheezing, rhonchi or rales.   Musculoskeletal:      Cervical back: Normal range of motion and neck supple.   Neurological:      Mental Status: She is alert and oriented to " person, place, and time.            Assessment and Plan     1. Hospital discharge follow-up    2. Acute UTI  -     cephALEXin (KEFLEX) 250 MG capsule; Take 1 capsule (250 mg total) by mouth every 12 (twelve) hours. for 7 days  Dispense: 14 capsule; Refill: 0    3. Dermatitis  -     triamcinolone acetonide 0.1% (KENALOG) 0.1 % cream; Apply topically 2 (two) times daily.  Dispense: 30 g; Refill: 1    4. Coronary artery disease involving native coronary artery without angina pectoris, unspecified whether native or transplanted heart  -     Ambulatory referral/consult to Cardiology; Future; Expected date: 02/27/2024  Referral as she is due for follow up with cardiology    5. Dementia, unspecified dementia severity, unspecified dementia type, unspecified whether behavioral, psychotic, or mood disturbance or anxiety  -     memantine (NAMENDA) 5 MG Tab; Take 1 tablet (5 mg total) by mouth once daily.  Dispense: 30 tablet; Refill: 11  Trial of namenda for dementia.                No follow-ups on file.

## 2024-08-02 RX ORDER — CARVEDILOL 12.5 MG/1
TABLET ORAL
Qty: 180 TABLET | Refills: 1 | Status: SHIPPED | OUTPATIENT
Start: 2024-08-02

## 2024-08-02 NOTE — TELEPHONE ENCOUNTER
Refill Decision Note   Maritza Rivas  is requesting a refill authorization.  Brief Assessment and Rationale for Refill:  Approve     Medication Therapy Plan:         Comments:     Note composed:8:14 AM 08/02/2024

## 2024-08-02 NOTE — TELEPHONE ENCOUNTER
No care due was identified.  Health [facility] Embedded Care Due Messages. Reference number: [id number].   8/02/2024 12:14:57 AM CDT

## 2025-02-24 ENCOUNTER — TELEPHONE (OUTPATIENT)
Dept: FAMILY MEDICINE | Facility: CLINIC | Age: OVER 89
End: 2025-02-24
Payer: MEDICARE

## 2025-02-24 ENCOUNTER — PATIENT MESSAGE (OUTPATIENT)
Dept: FAMILY MEDICINE | Facility: CLINIC | Age: OVER 89
End: 2025-02-24
Payer: MEDICARE

## 2025-02-24 DIAGNOSIS — E03.9 HYPOTHYROIDISM, UNSPECIFIED TYPE: ICD-10-CM

## 2025-02-24 RX ORDER — LEVOTHYROXINE SODIUM 75 UG/1
TABLET ORAL
Qty: 30 TABLET | Refills: 1 | Status: SHIPPED | OUTPATIENT
Start: 2025-02-24

## 2025-02-24 RX ORDER — CEPHALEXIN 500 MG/1
500 CAPSULE ORAL EVERY 12 HOURS
Qty: 14 CAPSULE | Refills: 0 | Status: SHIPPED | OUTPATIENT
Start: 2025-02-24 | End: 2025-03-03

## 2025-02-24 NOTE — TELEPHONE ENCOUNTER
Returned call to pts DIL. Dr. Lowe reviewed the labs done by cards and sent in antibiotics for UTI. Scheduled pt for f/u appt with PCP for next week.

## 2025-02-24 NOTE — TELEPHONE ENCOUNTER
----- Message from Reyna sent at 2/24/2025 10:15 AM CST -----  Regarding: Daughter  Who called: DaughterWhat is the request in detail: pt is have orders faxed over from [facility]  regarding UTI meds that has to be ordered by pcpCan the clinic reply by [facility]? NoWould the patient rather a call back or a response via [facility]? Call backBTuba City [facility] call back number: [phone] [facility]reyAdditional Information: [facility]/pharmacy #[id number] - [place] - [id number] [address]Ymcyh: [phone] Fax: [phone] you.

## 2025-02-24 NOTE — TELEPHONE ENCOUNTER
No care due was identified.  [facility] Embedded Care Due Messages. Reference number: [id number].   2/24/2025 12:12:33 PM CST

## 2025-02-24 NOTE — TELEPHONE ENCOUNTER
----- Message from Sandor sent at 2/24/2025  4:25 PM CST -----  Who called:daughter in law What is the request in detail:daughter in law  would like for you to give her a call in regards of her mom she say her mom needs medication for uit Can the clinic reply by [facility]? Would the patient rather a call back or a response via [facility]?callback  Best call back number:[phone] daughter in law number as well  Additional Information:call edith her son

## 2025-03-10 ENCOUNTER — OFFICE VISIT (OUTPATIENT)
Dept: FAMILY MEDICINE | Facility: CLINIC | Age: OVER 89
End: 2025-03-10
Payer: MEDICARE

## 2025-03-10 VITALS
SYSTOLIC BLOOD PRESSURE: 124 MMHG | WEIGHT: 143.06 LBS | TEMPERATURE: 98 F | HEART RATE: 59 BPM | DIASTOLIC BLOOD PRESSURE: 66 MMHG | OXYGEN SATURATION: 95 % | BODY MASS INDEX: 32.18 KG/M2 | HEIGHT: 56 IN

## 2025-03-10 DIAGNOSIS — N39.0 ACUTE UTI: ICD-10-CM

## 2025-03-10 DIAGNOSIS — R79.9 ABNORMAL FINDING OF BLOOD CHEMISTRY, UNSPECIFIED: ICD-10-CM

## 2025-03-10 DIAGNOSIS — E03.9 HYPOTHYROIDISM, UNSPECIFIED TYPE: ICD-10-CM

## 2025-03-10 DIAGNOSIS — F03.90 DEMENTIA, UNSPECIFIED DEMENTIA SEVERITY, UNSPECIFIED DEMENTIA TYPE, UNSPECIFIED WHETHER BEHAVIORAL, PSYCHOTIC, OR MOOD DISTURBANCE OR ANXIETY: Primary | ICD-10-CM

## 2025-03-10 DIAGNOSIS — I25.10 CORONARY ARTERY DISEASE INVOLVING NATIVE CORONARY ARTERY WITHOUT ANGINA PECTORIS, UNSPECIFIED WHETHER NATIVE OR TRANSPLANTED HEART: ICD-10-CM

## 2025-03-10 DIAGNOSIS — Z23 NEED FOR INFLUENZA VACCINATION: ICD-10-CM

## 2025-03-10 DIAGNOSIS — Z99.3 WHEELCHAIR DEPENDENCE: ICD-10-CM

## 2025-03-10 DIAGNOSIS — N18.32 STAGE 3B CHRONIC KIDNEY DISEASE: ICD-10-CM

## 2025-03-10 DIAGNOSIS — I11.9 BENIGN HYPERTENSIVE HEART DISEASE WITHOUT HEART FAILURE: ICD-10-CM

## 2025-03-10 PROCEDURE — 1126F AMNT PAIN NOTED NONE PRSNT: CPT | Mod: CPTII,S$GLB,, | Performed by: FAMILY MEDICINE

## 2025-03-10 PROCEDURE — 87088 URINE BACTERIA CULTURE: CPT | Performed by: FAMILY MEDICINE

## 2025-03-10 PROCEDURE — 99214 OFFICE O/P EST MOD 30 MIN: CPT | Mod: 25,S$GLB,, | Performed by: FAMILY MEDICINE

## 2025-03-10 PROCEDURE — G0008 ADMIN INFLUENZA VIRUS VAC: HCPCS | Mod: S$GLB,,, | Performed by: FAMILY MEDICINE

## 2025-03-10 PROCEDURE — 87086 URINE CULTURE/COLONY COUNT: CPT | Performed by: FAMILY MEDICINE

## 2025-03-10 PROCEDURE — 99999 PR PBB SHADOW E&M-EST. PATIENT-LVL V: CPT | Mod: PBBFAC,,, | Performed by: FAMILY MEDICINE

## 2025-03-10 PROCEDURE — 1157F ADVNC CARE PLAN IN RCRD: CPT | Mod: CPTII,S$GLB,, | Performed by: FAMILY MEDICINE

## 2025-03-10 PROCEDURE — 87186 SC STD MICRODIL/AGAR DIL: CPT | Performed by: FAMILY MEDICINE

## 2025-03-10 PROCEDURE — 1101F PT FALLS ASSESS-DOCD LE1/YR: CPT | Mod: CPTII,S$GLB,, | Performed by: FAMILY MEDICINE

## 2025-03-10 PROCEDURE — 90653 IIV ADJUVANT VACCINE IM: CPT | Mod: S$GLB,,, | Performed by: FAMILY MEDICINE

## 2025-03-10 PROCEDURE — 3288F FALL RISK ASSESSMENT DOCD: CPT | Mod: CPTII,S$GLB,, | Performed by: FAMILY MEDICINE

## 2025-03-10 PROCEDURE — 81002 URINALYSIS NONAUTO W/O SCOPE: CPT | Mod: S$GLB,,, | Performed by: FAMILY MEDICINE

## 2025-03-10 NOTE — PROGRESS NOTES
"Subjective     Patient ID: Maritza Rivas is a 89 y.o. female.    Chief Complaint: Urinary Tract Infection    89 year-old female presents with her family for follow-up. She was tested with cardiology for a UTI. She was diagnosed and treated and they  are here for follow up and retesting.  She lives with her son and daughter in law now.    They are here to get a health care professional to help with bathing. They are aware that this will not be covered with insurance but would like some help finding a personal care attendants    She is getting more forgetful. She is forgettig her nighttime medication. Her son has started administering her medication.           History of Present Illness               Review of Systems         Objective     Vitals:    03/10/25 1408   BP: 124/66   Pulse: (!) 59   Temp: 97.8 °F (36.6 °C)   TempSrc: Oral   SpO2: 95%   Weight: 64.9 kg (143 lb 1.3 oz)   Height: 4' 8" (1.422 m)        Physical Exam  Constitutional:       General: She is not in acute distress.     Appearance: She is well-developed. She is not diaphoretic.   HENT:      Head: Normocephalic and atraumatic.   Eyes:      Conjunctiva/sclera: Conjunctivae normal.   Cardiovascular:      Rate and Rhythm: Normal rate and regular rhythm.      Heart sounds: Normal heart sounds. No murmur heard.     No friction rub. No gallop.   Pulmonary:      Effort: Pulmonary effort is normal. No respiratory distress.      Breath sounds: Normal breath sounds. No wheezing or rales.   Musculoskeletal:      Comments: In   a wheelchair   Neurological:      Mental Status: She is alert.       Physical Exam                Assessment and Plan     1. Dementia, unspecified dementia severity, unspecified dementia type, unspecified whether behavioral, psychotic, or mood disturbance or anxiety    2. Coronary artery disease involving native coronary artery without angina pectoris, unspecified whether native or transplanted heart  -     LIPID PANEL; Future; " Expected date: 03/10/2025  -     TSH; Future; Expected date: 03/10/2025    3. Stage 3b chronic kidney disease  -     LIPID PANEL; Future; Expected date: 03/10/2025  -     TSH; Future; Expected date: 03/10/2025  -     Hemoglobin A1C; Future; Expected date: 03/10/2025    4. Benign hypertensive heart disease without heart failure  -     LIPID PANEL; Future; Expected date: 03/10/2025  -     TSH; Future; Expected date: 03/10/2025    5. Hypothyroidism, unspecified type  -     TSH; Future; Expected date: 03/10/2025    6. Acute UTI  -     POCT URINE DIPSTICK WITHOUT MICROSCOPE  -     Urine Culture High Risk    7. Abnormal finding of blood chemistry, unspecified  -     Hemoglobin A1C; Future; Expected date: 03/10/2025    8. Need for influenza vaccination  -     influenza (adjuvanted) (Fluad) 45 mcg/0.5 mL IM vaccine (> or = 64 yo) 0.5 mL      Maritza was seen today for urinary tract infection.    Diagnoses and all orders for this visit:    Dementia, unspecified dementia severity, unspecified dementia type, unspecified whether behavioral, psychotic, or mood disturbance or anxiety    Coronary artery disease involving native coronary artery without angina pectoris, unspecified whether native or transplanted heart  -     LIPID PANEL; Future  -     TSH; Future    Stage 3b chronic kidney disease  -     LIPID PANEL; Future  -     TSH; Future  -     Hemoglobin A1C; Future    Benign hypertensive heart disease without heart failure  -     LIPID PANEL; Future  -     TSH; Future    Hypothyroidism, unspecified type  -     TSH; Future    Acute UTI  -     POCT URINE DIPSTICK WITHOUT MICROSCOPE  -     Urine Culture High Risk    Abnormal finding of blood chemistry, unspecified  -     Hemoglobin A1C; Future    Need for influenza vaccination  -     influenza (adjuvanted) (Fluad) 45 mcg/0.5 mL IM vaccine (> or = 64 yo) 0.5 mL        Assessment & Plan                      No follow-ups on file.        This note was generated with the  assistance of ambient listening technology. Verbal consent was obtained by the patient and accompanying visitor(s) for the recording of patient appointment to facilitate this note. I attest to having reviewed and edited the generated note for accuracy, though some syntax or spelling errors may persist. Please contact the author of this note for any clarification.

## 2025-03-11 LAB
BILIRUB SERPL-MCNC: NEGATIVE MG/DL
BLOOD URINE, POC: NEGATIVE
CLARITY, POC UA: CLEAR
COLOR, POC UA: YELLOW
GLUCOSE UR QL STRIP: 60
KETONES UR QL STRIP: NORMAL
LEUKOCYTE ESTERASE URINE, POC: NEGATIVE
NITRITE, POC UA: NEGATIVE
PH, POC UA: 5
PROTEIN, POC: POSITIVE
SPECIFIC GRAVITY, POC UA: 0.01
UROBILINOGEN, POC UA: NORMAL

## 2025-03-12 ENCOUNTER — PATIENT MESSAGE (OUTPATIENT)
Dept: FAMILY MEDICINE | Facility: CLINIC | Age: OVER 89
End: 2025-03-12
Payer: MEDICARE

## 2025-03-12 ENCOUNTER — LAB VISIT (OUTPATIENT)
Dept: LAB | Facility: HOSPITAL | Age: OVER 89
End: 2025-03-12
Attending: FAMILY MEDICINE
Payer: MEDICARE

## 2025-03-12 DIAGNOSIS — I25.10 CORONARY ARTERY DISEASE INVOLVING NATIVE CORONARY ARTERY WITHOUT ANGINA PECTORIS, UNSPECIFIED WHETHER NATIVE OR TRANSPLANTED HEART: ICD-10-CM

## 2025-03-12 DIAGNOSIS — I11.9 BENIGN HYPERTENSIVE HEART DISEASE WITHOUT HEART FAILURE: ICD-10-CM

## 2025-03-12 DIAGNOSIS — R79.9 ABNORMAL FINDING OF BLOOD CHEMISTRY, UNSPECIFIED: ICD-10-CM

## 2025-03-12 DIAGNOSIS — N18.32 STAGE 3B CHRONIC KIDNEY DISEASE: ICD-10-CM

## 2025-03-12 DIAGNOSIS — E03.9 HYPOTHYROIDISM, UNSPECIFIED TYPE: ICD-10-CM

## 2025-03-12 LAB
CHOLEST SERPL-MCNC: 157 MG/DL (ref 120–199)
CHOLEST/HDLC SERPL: 2.6 {RATIO} (ref 2–5)
ESTIMATED AVG GLUCOSE: 120 MG/DL (ref 68–131)
HBA1C MFR BLD: 5.8 % (ref 4–5.6)
HDLC SERPL-MCNC: 60 MG/DL (ref 40–75)
HDLC SERPL: 38.2 % (ref 20–50)
LDLC SERPL CALC-MCNC: 74 MG/DL (ref 63–159)
NONHDLC SERPL-MCNC: 97 MG/DL
TRIGL SERPL-MCNC: 115 MG/DL (ref 30–150)
TSH SERPL DL<=0.005 MIU/L-ACNC: 3.3 UIU/ML (ref 0.4–4)

## 2025-03-12 PROCEDURE — 83036 HEMOGLOBIN GLYCOSYLATED A1C: CPT | Performed by: FAMILY MEDICINE

## 2025-03-12 PROCEDURE — 36415 COLL VENOUS BLD VENIPUNCTURE: CPT | Mod: PO | Performed by: FAMILY MEDICINE

## 2025-03-12 PROCEDURE — 84443 ASSAY THYROID STIM HORMONE: CPT | Performed by: FAMILY MEDICINE

## 2025-03-12 PROCEDURE — 80061 LIPID PANEL: CPT | Performed by: FAMILY MEDICINE

## 2025-03-13 ENCOUNTER — PATIENT MESSAGE (OUTPATIENT)
Dept: FAMILY MEDICINE | Facility: CLINIC | Age: OVER 89
End: 2025-03-13
Payer: MEDICARE

## 2025-03-13 LAB — BACTERIA UR CULT: ABNORMAL

## 2025-03-13 RX ORDER — SULFAMETHOXAZOLE AND TRIMETHOPRIM 800; 160 MG/1; MG/1
1 TABLET ORAL 2 TIMES DAILY
Qty: 10 TABLET | Refills: 0 | Status: SHIPPED | OUTPATIENT
Start: 2025-03-13 | End: 2025-03-18

## 2025-03-20 ENCOUNTER — PATIENT MESSAGE (OUTPATIENT)
Dept: FAMILY MEDICINE | Facility: CLINIC | Age: OVER 89
End: 2025-03-20
Payer: MEDICARE

## 2025-03-20 ENCOUNTER — RESULTS FOLLOW-UP (OUTPATIENT)
Dept: FAMILY MEDICINE | Facility: CLINIC | Age: OVER 89
End: 2025-03-20

## 2025-03-20 DIAGNOSIS — S42.401A CLOSED FRACTURE DISLOCATION OF RIGHT ELBOW, INITIAL ENCOUNTER: ICD-10-CM

## 2025-03-20 DIAGNOSIS — F03.90 DEMENTIA, UNSPECIFIED DEMENTIA SEVERITY, UNSPECIFIED DEMENTIA TYPE, UNSPECIFIED WHETHER BEHAVIORAL, PSYCHOTIC, OR MOOD DISTURBANCE OR ANXIETY: Primary | ICD-10-CM

## 2025-03-20 DIAGNOSIS — I25.10 CORONARY ARTERY DISEASE INVOLVING NATIVE CORONARY ARTERY WITHOUT ANGINA PECTORIS, UNSPECIFIED WHETHER NATIVE OR TRANSPLANTED HEART: ICD-10-CM

## 2025-03-21 ENCOUNTER — PATIENT MESSAGE (OUTPATIENT)
Dept: FAMILY MEDICINE | Facility: CLINIC | Age: OVER 89
End: 2025-03-21
Payer: MEDICARE

## 2025-03-21 ENCOUNTER — PATIENT OUTREACH (OUTPATIENT)
Dept: ADMINISTRATIVE | Facility: CLINIC | Age: OVER 89
End: 2025-03-21
Payer: MEDICARE

## 2025-03-21 DIAGNOSIS — S42.401B ELBOW FRACTURE, RIGHT, OPEN, INITIAL ENCOUNTER: Primary | ICD-10-CM

## 2025-03-21 NOTE — PROGRESS NOTES
C3 nurse spoke with Maritza Rivas's son Irvin and DIL Ramona for a TCC post hospital discharge follow up call. The patient does not have a scheduled HOSFU appointment with Samantha Lowe MD within 5-7 days post hospital discharge date 3/18/25. C3 nurse was unable to schedule HOSFU appointment in [facility].  Message sent to PCP staff requesting they contact patient and schedule follow up appointment.  Referral also placed for NP at home per family's request.  Patient's DIL confirmed that the patient is taking cephALEXin 500mg 4 times daily and  oxyCODONE 5mg every 8 hours PRN pain.  Medication could not be reconciled as it is not on the patient's medication list.

## 2025-03-24 ENCOUNTER — TELEPHONE (OUTPATIENT)
Dept: HOME HEALTH SERVICES | Facility: CLINIC | Age: OVER 89
End: 2025-03-24
Payer: MEDICARE

## 2025-03-24 ENCOUNTER — PATIENT OUTREACH (OUTPATIENT)
Dept: ADMINISTRATIVE | Facility: OTHER | Age: OVER 89
End: 2025-03-24
Payer: MEDICARE

## 2025-03-24 DIAGNOSIS — Z00.00 ENCOUNTER FOR MEDICARE ANNUAL WELLNESS EXAM: ICD-10-CM

## 2025-03-24 NOTE — TELEPHONE ENCOUNTER
Contacted pt to schedule an appointment regarding a referral placed for a tcc home visit with a nurse practitioner. Spk with son Irvin, patient has been scheduled

## 2025-03-24 NOTE — PROGRESS NOTES
CHW - Initial Contact    This Community Health Worker completed OR updated the Social Determinant of Health questionnaire with caregiver via telephone today.    Pt identified barriers of most importance are: Has no need at this time   Referrals to community agencies completed with patient/caregiver consent outside of [facility] include:  none  Referrals were put through [facility] - no: none  Support and Services: No suppport at this time  Other information discussed the patient needs / wants help with: Verified SDOH with daughter in law Ramona. Patient has no need at this time. CHW contact provided if any needs arise.   Follow up required: no  No future outreach task assigned

## 2025-04-01 ENCOUNTER — OFFICE VISIT (OUTPATIENT)
Dept: HOME HEALTH SERVICES | Facility: CLINIC | Age: OVER 89
End: 2025-04-01
Payer: MEDICARE

## 2025-04-01 VITALS
TEMPERATURE: 97 F | OXYGEN SATURATION: 96 % | SYSTOLIC BLOOD PRESSURE: 127 MMHG | DIASTOLIC BLOOD PRESSURE: 84 MMHG | HEART RATE: 76 BPM | RESPIRATION RATE: 17 BRPM

## 2025-04-01 DIAGNOSIS — E03.9 HYPOTHYROIDISM, UNSPECIFIED TYPE: ICD-10-CM

## 2025-04-01 DIAGNOSIS — I25.10 CORONARY ARTERY DISEASE INVOLVING NATIVE CORONARY ARTERY WITHOUT ANGINA PECTORIS, UNSPECIFIED WHETHER NATIVE OR TRANSPLANTED HEART: ICD-10-CM

## 2025-04-01 DIAGNOSIS — S42.401B ELBOW FRACTURE, RIGHT, OPEN, INITIAL ENCOUNTER: ICD-10-CM

## 2025-04-01 DIAGNOSIS — S42.401S: ICD-10-CM

## 2025-04-01 DIAGNOSIS — R60.0 LOCALIZED EDEMA: ICD-10-CM

## 2025-04-01 DIAGNOSIS — E11.9 DIET-CONTROLLED DIABETES MELLITUS: ICD-10-CM

## 2025-04-01 DIAGNOSIS — R60.9 EDEMA, UNSPECIFIED TYPE: Primary | ICD-10-CM

## 2025-04-01 DIAGNOSIS — I11.9 BENIGN HYPERTENSIVE HEART DISEASE WITHOUT HEART FAILURE: ICD-10-CM

## 2025-04-01 DIAGNOSIS — N18.32 STAGE 3B CHRONIC KIDNEY DISEASE: ICD-10-CM

## 2025-04-01 RX ORDER — FUROSEMIDE 20 MG/1
10 TABLET ORAL
Qty: 30 TABLET | Refills: 2 | Status: SHIPPED | OUTPATIENT
Start: 2025-04-03

## 2025-04-02 ENCOUNTER — PATIENT MESSAGE (OUTPATIENT)
Dept: FAMILY MEDICINE | Facility: CLINIC | Age: OVER 89
End: 2025-04-02
Payer: MEDICARE

## 2025-04-02 PROBLEM — R60.0 LOCALIZED EDEMA: Status: ACTIVE | Noted: 2025-04-02

## 2025-04-02 PROBLEM — S42.401B: Status: ACTIVE | Noted: 2025-04-02

## 2025-04-03 NOTE — ASSESSMENT & PLAN NOTE
Right arm noted in cast with 2+ edema to right hand. Patient denies pain/discomfort.   - fall precaution   - elevated RUE  - F/U w/ Ortho

## 2025-04-03 NOTE — ASSESSMENT & PLAN NOTE
Lab Results   Component Value Date    HGBA1C 5.8 (H) 03/12/2025   Discussed with patient routine diabetic care that includes but are not limited to regular eye exams, daily foot exams, proper nutrition, regular BG monitoring at home (fasting and mealtime) and medication compliance in a diabetic.  Target morning BS  and meal-time BS <180.      - continue NCS diet   - F/U w/ PCP

## 2025-04-03 NOTE — ASSESSMENT & PLAN NOTE
Last recorded BNP was 387  - monitor for edema  - elevated BLE  - continue Lasix  - weight daily   - F/U w/ PCP

## 2025-04-03 NOTE — ASSESSMENT & PLAN NOTE
Denies chest pain. BP today 127/84. Well controlled  - monitor for chest pain  - monitor BP  - monitor for edema and daily weights   - continue amlodipine, coreg, lasix, valsartan   - DASH, heart healthy diet   - F/U w/ PCP

## 2025-04-03 NOTE — ASSESSMENT & PLAN NOTE
Lab Results   Component Value Date    TSH 3.303 03/12/2025    Stable  - monitor for intolerance to heat/cold and unintentional weight loss/gain.   - continue synthroid   - F/U w/ PCP

## 2025-04-03 NOTE — PROGRESS NOTES
[facility] [place]  Transitional Care Management (TCM) Home Visit    Encounter Provider: Ivett Dangelo   PCP: Samantha Lowe MD  Consult Requested By: Dr. Quin Bryant  Admit Date: 3/12/25  IP Discharge Date: 3/18/25  Hospital Length of Stay: 6  Days since discharge (from IP or SNF):    [facility] On Call Contact Note: 3/21/25  Hospital Diagnosis: Elbow fracture, right, open, initial encounter [S42.401B]     HISTORY OF PRESENT ILLNESS      Patient ID: Maritza Rivas is a 89 y.o. female was recently admitted to the hospital, this is their TCM encounter.    Hospital Course Synopsis:    89 yo female that lives with son presented sp mechanical fall with open r elbow fracture. Sp intra-articular extension. She tolerated surgery well. Post op she had a fever. Empiric abx were started. Ultimately work up was negative and abx discontinued. She was sent home on a week of keflex per orthopedic surgery. Family decided to take patient home with HH which I think was very reasonable given degree of family support and patient's age.     DECISION MAKING TODAY       Assessment & Plan:  1. Edema, unspecified type  -     furosemide (LASIX) 20 MG tablet; Take 0.5 tablets (10 mg total) by mouth twice a week.  Dispense: 30 tablet; Refill: 2    2. Elbow fracture, right, open, initial encounter  -     [facility]    3. Diet-controlled diabetes mellitus  Assessment & Plan:  Lab Results   Component Value Date    HGBA1C 5.8 (H) 03/12/2025   Discussed with patient routine diabetic care that includes but are not limited to regular eye exams, daily foot exams, proper nutrition, regular BG monitoring at home (fasting and mealtime) and medication compliance in a diabetic.  Target morning BS  and meal-time BS <180.      - continue NCS diet   - F/U w/ PCP       4. Benign hypertensive heart disease without heart failure  Assessment & Plan:  Denies chest pain. BP today 127/84. Well  "controlled  - monitor for chest pain  - monitor BP  - monitor for edema and daily weights   - continue amlodipine, coreg, lasix, valsartan   - DASH, heart healthy diet   - F/U w/ PCP       5. Coronary artery disease involving native coronary artery without angina pectoris, unspecified whether native or transplanted heart  Assessment & Plan:  See "benign hypertensive heart disease without heart failure"       6. Stage 3b chronic kidney disease  Assessment & Plan:  Last recorded creat 1.48 GFR 32.   Based on current GFR, CKD stage is stage 3 - GFR 30-59.  Monitor UOP and serial BMP and adjust therapy as needed. Renally dose meds. Avoid nephrotoxic medications and procedures. F/U w/ PCP for serial BMP monitoring.       7. Hypothyroidism, unspecified type  Assessment & Plan:  Lab Results   Component Value Date    TSH 3.303 03/12/2025    Stable  - monitor for intolerance to heat/cold and unintentional weight loss/gain.   - continue synthroid   - F/U w/ PCP       8. Open fracture of right elbow, sequela  Overview:  Right elbow fracture secondary to fall s/p surgical intervention.     Assessment & Plan:  Right arm noted in cast with 2+ edema to right hand. Patient denies pain/discomfort.   - fall precaution   - elevated RUE  - F/U w/ Ortho         9. Localized edema  Overview:  3+/4+ BLE edema (see media)     Assessment & Plan:  Last recorded BNP was 387  - monitor for edema  - elevated BLE  - continue Lasix  - weight daily   - F/U w/ PCP            Medication List on Discharge:     Medication List            Accurate as of April 1, 2025 11:59 PM. If you have any questions, ask your nurse or doctor.                CONTINUE taking these medications      amLODIPine 10 MG tablet  Commonly known as: NORVASC  Take 1 tablet (10 mg total) by mouth once daily.     aspirin 81 MG EC tablet  Commonly known as: ECOTRIN  Take 81 mg by mouth nightly.     blood-glucose meter Misc  Commonly known as: TRUE METRIX GLUCOSE METER  Use as " directed     carvediloL 12.5 MG tablet  Commonly known as: COREG  TAKE 1 TABLET BY MOUTH TWICE A DAY WITH MEALS     ezetimibe 10 mg tablet  Commonly known as: ZETIA  Take 10 mg by mouth every evening.     furosemide 20 MG tablet  Commonly known as: LASIX  Take 0.5 tablets (10 mg total) by mouth twice a week.  Start taking on: April 3, 2025     lancets Misc  True Metrix Lancets. CHECK SUGAR DAILY     levothyroxine 75 MCG tablet  Commonly known as: SYNTHROID  TAKE 1 TABLET BY MOUTH BEFORE BREAKFAST.     pravastatin 20 MG tablet  Commonly known as: PRAVACHOL  TAKE 1 TABLET BY MOUTH EVERY DAY     triamcinolone acetonide 0.1% 0.1 % cream  Commonly known as: KENALOG  Apply topically 2 (two) times daily.     TRUE METRIX GLUCOSE TEST STRIP Strp  Generic drug: blood sugar diagnostic  Check blood sugar DAILY     valsartan 80 MG tablet  Commonly known as: DIOVAN  TAKE 1 TABLET BY MOUTH EVERY DAY     VITAMIN D3 ORAL  Take by mouth.              Medication Reconciliation:  Were medications changed on discharge? Yes  Were medications in the home? Yes  Is the patient taking the medications as directed? Yes  Does the patient understand the medications and changes? Yes  Does updated med list accurately reflects meds patient is currently taking? Yes    ENVIRONMENT OF CARE      Family and/or Caregiver present at visit?  Yes  Name of Caregiver:   History provided by: caregiver    Advance Care Planning   Advanced Care Planning Status:  Patient has had an ACP conversation  Living Will: No  Power of : No  [place]: No    Does Caregiver have HCPoA: No  Changes today:   Is patient hospice appropriate: No  (If needed, use PPS <30 or FAST score >7)  Was referral to hospice placed: No       Impression upon entering the home:  Physical Dwelling: single family home   Appearance of home environment: cleaniness: clean  Functional Status: moderate assistance  Mobility: chair bound  Nutritional access: adequate intake and access  Home Health:  No, and does not need it at this time   DME/Supplies: none     Diagnostic tests reviewed/disposition: No diagnosic tests pending after this hospitalization.  Disease/illness education:  edema   Establishment or re-establishment of referral orders for community resources: No other necessary community resources.   Discussion with other health care providers: No discussion with other health care providers necessary.   Does patient have a PCP at [place]? Yes   Repatriation plan with PCP? follow-up with PCP within 90d   Does patient have an ostomy (ileostomy, colostomy, suprapubic catheter, nephrostomy tube, tracheostomy, PEG tube, pleurex catheter, cholecystostomy, etc)? No  Were BPAs reviewed? Yes    Social History     Socioeconomic History    Marital status:    Tobacco Use    Smoking status: Never    Smokeless tobacco: Never   Substance and Sexual Activity    Alcohol use: No    Drug use: No    Sexual activity: Never     Social Drivers of Health     Financial Resource Strain: Low Risk  (3/10/2025)    Overall Financial Resource Strain (CARDIA)     Difficulty of Paying Living Expenses: Not hard at all   Food Insecurity: No Food Insecurity (3/13/2025)    Received from [facility]    Hunger Vital Sign     Worried About Running Out of Food in the Last Year: Never true     Ran Out of Food in the Last Year: Never true   Transportation Needs: No Transportation Needs (3/13/2025)    Received from [facility]    PRAPARE - Transportation     Lack of Transportation (Medical): No     Lack of Transportation (Non-Medical): No   Physical Activity: Inactive (3/10/2025)    Exercise Vital Sign     Days of Exercise per Week: 0 days     Minutes of Exercise per Session: 0 min   Stress: Stress Concern Present (3/10/2025)    [employer] [employer] - Occupational Stress Questionnaire     Feeling of Stress : To some extent   Housing Stability: Low Risk  (3/13/2025)    Received from [facility]    Housing Stability Vital Sign      Unable to Pay for Housing in the Last Year: No     Number of Times Moved in the Last Year: 1     Homeless in the Last Year: No       OBJECTIVE:     Vital Signs:  Vitals:    04/01/25 1127   BP: 127/84   Pulse: 76   Resp: 17   Temp: 97.2 °F (36.2 °C)       Review of Systems   Constitutional:  Positive for fatigue. Negative for chills and fever.   HENT:  Negative for congestion, rhinorrhea and trouble swallowing.    Eyes:  Negative for visual disturbance.   Respiratory:  Negative for cough and shortness of breath.    Cardiovascular:  Negative for chest pain.   Gastrointestinal:  Negative for abdominal pain, constipation, diarrhea, nausea and vomiting.   Endocrine: Negative for polydipsia, polyphagia and polyuria.   Genitourinary:  Negative for difficulty urinating and dysuria.   Musculoskeletal:  Negative for arthralgias, back pain, myalgias and neck pain.   Allergic/Immunologic: Negative for environmental allergies.   Neurological:  Positive for weakness. Negative for dizziness and headaches.   Psychiatric/Behavioral:  Negative for behavioral problems, hallucinations, sleep disturbance and suicidal ideas.        Physical Exam:  Physical Exam  Constitutional:       General: She is not in acute distress.  HENT:      Head: Normocephalic and atraumatic.      Nose: No rhinorrhea.   Eyes:      General: No scleral icterus.  Cardiovascular:      Rate and Rhythm: Normal rate.   Pulmonary:      Effort: Pulmonary effort is normal. No respiratory distress.      Breath sounds: Normal breath sounds.   Abdominal:      General: Bowel sounds are normal. There is no distension.      Palpations: Abdomen is soft.      Tenderness: There is no abdominal tenderness.   Musculoskeletal:      Cervical back: No rigidity or tenderness.      Right lower leg: Edema present.      Left lower leg: Edema present.   Skin:     General: Skin is warm and dry.   Neurological:      Mental Status: She is alert. Mental status is at baseline.      Motor:  Weakness present.   Psychiatric:         Mood and Affect: Mood normal.         Behavior: Behavior normal.         INSTRUCTIONS FOR PATIENT:     Scheduled Follow-up, Appts Reviewed with Modifications if Needed: Yes  No future appointments.    Take all medications as prescribed  Keep all follow-up appointments  Return to the hospital or call your primary care provider if any worsening symptoms such as fever, chest pain, shortness of breath, return of symptoms, or any other concerns.      Signature: PAMELA Rodrigues    Transition of Care Visit:  I have reviewed and updated the history and problem list.  I have reconciled the medication list.  I have discussed the hospitalization and current medical issues, prognosis and plans with the patient/family.

## 2025-04-03 NOTE — ASSESSMENT & PLAN NOTE
Last recorded creat 1.48 GFR 32.   Based on current GFR, CKD stage is stage 3 - GFR 30-59.  Monitor UOP and serial BMP and adjust therapy as needed. Renally dose meds. Avoid nephrotoxic medications and procedures. F/U w/ PCP for serial BMP monitoring.

## 2025-04-08 ENCOUNTER — TELEPHONE (OUTPATIENT)
Dept: FAMILY MEDICINE | Facility: CLINIC | Age: OVER 89
End: 2025-04-08
Payer: MEDICARE

## 2025-04-08 NOTE — TELEPHONE ENCOUNTER
Called [facility] and provided a verbal for nurse visit to obtain a UA specimen. Verbalized back and verified order.

## 2025-04-08 NOTE — TELEPHONE ENCOUNTER
Called Francesca at [facility] patient has possible UTI symptoms, family have noticed  urgency and foul odor to urine since 4 April 2025 and with decrease appetite. Is requesting a verbal order for a nurse visit to collect a UA.  Nurse [facility] per the family stating the NP came out on 1 April and suggested labs.  The NP was going to reach out to .

## 2025-04-08 NOTE — TELEPHONE ENCOUNTER
----- Message from Manuel sent at 4/8/2025 12:18 PM CDT -----  Regarding: Francesca with [facility]                                                                      Provider InformationProvider: Francesca with [facility]: Stated that they are looking to a get PRN visit for the nurse to see the patient in person for a UTI. Please reach out to them as soon as possible. Contact Info:[phone] ask for Francesca Additional Info:

## 2025-04-11 ENCOUNTER — TELEPHONE (OUTPATIENT)
Dept: FAMILY MEDICINE | Facility: CLINIC | Age: OVER 89
End: 2025-04-11
Payer: MEDICARE

## 2025-04-11 DIAGNOSIS — N39.0 ACUTE UTI: Primary | ICD-10-CM

## 2025-04-11 NOTE — TELEPHONE ENCOUNTER
----- Message from Monisha sent at 4/11/2025  1:59 PM CDT -----  Regarding: Nurseing Home Papers  Patient's daughter in law came into the office and requested Dr Lowe reach out to them. They are trying to get her into [facility]. She fell and fractured her elbow and she is requiring physical therapy and other things that they cannot provide at home. She can be reached at [phone]

## 2025-04-14 ENCOUNTER — TELEPHONE (OUTPATIENT)
Dept: FAMILY MEDICINE | Facility: CLINIC | Age: OVER 89
End: 2025-04-14
Payer: MEDICARE

## 2025-04-14 NOTE — TELEPHONE ENCOUNTER
Monique Tsai Staff  Caller: Unspecified (Today,  9:17 AM)  Type: Patient Call Back     Who called:Daughter in Law (Ramona)     What is the request in detail:Checking the status of medication prescription regarding UA culture     Can the clinic reply by [facility]? No     Would the patient rather a call back or a response via [facility]? Call back     Best call back number:.[phone]     Additional Information:     Thank you.

## 2025-04-16 ENCOUNTER — OFFICE VISIT (OUTPATIENT)
Dept: FAMILY MEDICINE | Facility: CLINIC | Age: OVER 89
End: 2025-04-16
Payer: MEDICARE

## 2025-04-16 VITALS
TEMPERATURE: 98 F | WEIGHT: 143.06 LBS | OXYGEN SATURATION: 99 % | DIASTOLIC BLOOD PRESSURE: 64 MMHG | SYSTOLIC BLOOD PRESSURE: 100 MMHG | HEIGHT: 56 IN | BODY MASS INDEX: 32.18 KG/M2 | HEART RATE: 65 BPM

## 2025-04-16 DIAGNOSIS — D69.2 SENILE PURPURA: ICD-10-CM

## 2025-04-16 DIAGNOSIS — N30.01 ACUTE CYSTITIS WITH HEMATURIA: ICD-10-CM

## 2025-04-16 DIAGNOSIS — Z20.822 ENCOUNTER FOR LABORATORY TESTING FOR COVID-19 VIRUS: ICD-10-CM

## 2025-04-16 DIAGNOSIS — Z11.1 SCREENING-PULMONARY TB: Primary | ICD-10-CM

## 2025-04-16 DIAGNOSIS — S42.401S: ICD-10-CM

## 2025-04-16 LAB — SARS-COV-2 RNA RESP QL NAA+PROBE: NEGATIVE

## 2025-04-16 PROCEDURE — 1157F ADVNC CARE PLAN IN RCRD: CPT | Mod: CPTII,S$GLB,, | Performed by: NURSE PRACTITIONER

## 2025-04-16 PROCEDURE — 99213 OFFICE O/P EST LOW 20 MIN: CPT | Mod: S$GLB,,, | Performed by: NURSE PRACTITIONER

## 2025-04-16 PROCEDURE — 99999 PR PBB SHADOW E&M-EST. PATIENT-LVL IV: CPT | Mod: PBBFAC,,, | Performed by: NURSE PRACTITIONER

## 2025-04-16 PROCEDURE — 1100F PTFALLS ASSESS-DOCD GE2>/YR: CPT | Mod: CPTII,S$GLB,, | Performed by: NURSE PRACTITIONER

## 2025-04-16 PROCEDURE — 1159F MED LIST DOCD IN RCRD: CPT | Mod: CPTII,S$GLB,, | Performed by: NURSE PRACTITIONER

## 2025-04-16 PROCEDURE — 87635 SARS-COV-2 COVID-19 AMP PRB: CPT | Performed by: NURSE PRACTITIONER

## 2025-04-16 PROCEDURE — 1126F AMNT PAIN NOTED NONE PRSNT: CPT | Mod: CPTII,S$GLB,, | Performed by: NURSE PRACTITIONER

## 2025-04-16 PROCEDURE — 3288F FALL RISK ASSESSMENT DOCD: CPT | Mod: CPTII,S$GLB,, | Performed by: NURSE PRACTITIONER

## 2025-04-16 PROCEDURE — 86580 TB INTRADERMAL TEST: CPT | Mod: S$GLB,,, | Performed by: NURSE PRACTITIONER

## 2025-04-16 RX ORDER — CIPROFLOXACIN 250 MG/1
250 TABLET, FILM COATED ORAL DAILY
Qty: 5 TABLET | Refills: 0 | Status: SHIPPED | OUTPATIENT
Start: 2025-04-16 | End: 2025-04-21

## 2025-04-16 NOTE — PROGRESS NOTES
Subjective:       Patient ID: Maritza Rivas is a 89 y.o. female.    Chief Complaint: Orders and Urinary Tract Infection    HPI     Maritza Rivas is a 89 y.o. female patient that presents to clinic with a chief complaint of screening for nursing home placement.  Past medical and surgical history reviewed as listed. PCP is aSmantha Lowe MD , she is  known  to me.  She is accompanied by family.  Patient is pending admission at [facility].  She is needing tuberculosis and COVID-19 screening.  Additionally she has right elbow fracture that is immobilized in cast.  She is to follow up with Orthopedics today.  Family is also concerned about right lower extremity bruising.    Home health urine culture showed E coli, Proteus mirabilis, Pseudomonas aeruginosa, Serratia marcescens.  PCP ordered ciprofloxacin renally dosed.  Patient to  medication from pharmacy and start today.  Advised patient's family to bring prescription to nursing home so there is no lapse in dosing.    ROS as listed.  Past Medical History:   Diagnosis Date    Aortic stenosis     Aortic valve stenosis, critical     Carotid bruit     Carpal tunnel syndrome, bilateral     Coronary artery disease     Diabetes mellitus type II     H/O aortic valve replacement     Hypertension     Kidney insufficiency     PCP stated Stage 3    Pure hyperglyceridemia     Thyroid disease     hypothyroidism      Past Surgical History:   Procedure Laterality Date    ADENOIDECTOMY      AORTIC VALVE REPLACEMENT  2013    CARDIAC SURGERY  2013    3 VESSEL bypass    CARDIAC VALVE REPLACEMENT  2014    CATARACT EXTRACTION BILATERAL W/ ANTERIOR VITRECTOMY      CORONARY ARTERY BYPASS GRAFT  2014    ELBOW SURGERY Right 03/13/2025    EYE SURGERY  [place]    FRACTURE SURGERY  broke rist    TONSILLECTOMY  1946    VAGINAL DELIVERY      x9 normal      Family History   Problem Relation Name Age of Onset    Stroke Mother Katie Montana     Diabetes Mother Katie Montana  "    Heart disease Father [place]       Review of patient's allergies indicates:   Allergen Reactions    Avapro [irbesartan]      Weakness     Simvastatin      Leg cramps    Phenergan [promethazine] Other (See Comments)     confusion     Review of Systems    Objective:      Vitals:    04/16/25 0953   BP: 100/64   Pulse: 65   Temp: 98.2 °F (36.8 °C)   TempSrc: Oral   SpO2: 99%   Weight: 64.9 kg (143 lb 1.3 oz)   Height: 4' 8" (1.422 m)      Physical Exam  Vitals and nursing note reviewed.   Constitutional:       General: She is not in acute distress.  HENT:      Head: Normocephalic.   Eyes:      Conjunctiva/sclera: Conjunctivae normal.      Pupils: Pupils are equal, round, and reactive to light.   Pulmonary:      Effort: Pulmonary effort is normal. No respiratory distress.   Musculoskeletal:      Right elbow: Decreased range of motion. Tenderness present.      Right hand: Swelling present.      Cervical back: Normal range of motion.      Comments: Right upper extremity immobilized in a cast   Skin:     General: Skin is warm and dry.      Findings: Bruising present.   Neurological:      Mental Status: She is alert and oriented to person, place, and time.      Motor: Weakness present.      Gait: Gait abnormal.   Psychiatric:         Mood and Affect: Mood normal.         Behavior: Behavior normal.         Lab Results   Component Value Date    WBC 6.4 02/21/2025    HGB 13.8 02/21/2025    HCT 41.1 02/21/2025     12/29/2023    CHOL 157 03/12/2025    TRIG 115 03/12/2025    HDL 60 03/12/2025    ALT 10 03/25/2025    AST 24 03/25/2025     (L) 03/25/2025    K 4.2 03/25/2025     12/29/2023    CREATININE 1.48 (H) 03/25/2025    BUN 40.2 (H) 03/25/2025    CO2 22 (L) 03/25/2025    TSH 3.303 03/12/2025    INR 1.1 03/25/2025    HGBA1C 5.8 (H) 03/12/2025      Assessment:       1. Screening-pulmonary TB    2. Senile purpura    3. Open fracture of right elbow, sequela    4. Encounter for laboratory testing for COVID-19 " virus    5. Acute cystitis with hematuria        Plan:       Screening-pulmonary TB  -     POCT TB Skin Test Read    Senile purpura  Keep skin well moisturized.    Open fracture of right elbow, sequela  Stable, follow-up with Orthopedics today.    Encounter for laboratory testing for COVID-19 virus  -     COVID-19 Routine Screening; Future; Expected date: 04/16/2025  Acute cystitis with hematuria   Start ciprofloxacin.  Medication List with Changes/Refills   Current Medications    AMLODIPINE (NORVASC) 10 MG TABLET    Take 1 tablet (10 mg total) by mouth once daily.    ASPIRIN (ECOTRIN) 81 MG EC TABLET    Take 81 mg by mouth nightly.     BLOOD SUGAR DIAGNOSTIC (TRUE METRIX GLUCOSE TEST STRIP) STRP    Check blood sugar DAILY    BLOOD-GLUCOSE METER (TRUE METRIX GLUCOSE METER) MISC    Use as directed    CARVEDILOL (COREG) 12.5 MG TABLET    TAKE 1 TABLET BY MOUTH TWICE A DAY WITH MEALS    CHOLECALCIFEROL, VITAMIN D3, (VITAMIN D3 ORAL)    Take by mouth.    CIPROFLOXACIN HCL (CIPRO) 250 MG TABLET    Take 1 tablet (250 mg total) by mouth once daily. for 5 days    EZETIMIBE (ZETIA) 10 MG TABLET    Take 10 mg by mouth every evening.    FUROSEMIDE (LASIX) 20 MG TABLET    Take 0.5 tablets (10 mg total) by mouth twice a week.    LANCETS MISC    True Metrix Lancets. CHECK SUGAR DAILY    LEVOTHYROXINE (SYNTHROID) 75 MCG TABLET    TAKE 1 TABLET BY MOUTH BEFORE BREAKFAST.    PRAVASTATIN (PRAVACHOL) 20 MG TABLET    TAKE 1 TABLET BY MOUTH EVERY DAY    TRIAMCINOLONE ACETONIDE 0.1% (KENALOG) 0.1 % CREAM    Apply topically 2 (two) times daily.    VALSARTAN (DIOVAN) 80 MG TABLET    TAKE 1 TABLET BY MOUTH EVERY DAY                Lyn Beasley, DNP, APRN, FNP-C  Family Medicine  [facility]  04/16/2025 10:15 AM

## 2025-04-17 ENCOUNTER — RESULTS FOLLOW-UP (OUTPATIENT)
Dept: FAMILY MEDICINE | Facility: CLINIC | Age: OVER 89
End: 2025-04-17

## 2025-05-22 ENCOUNTER — EXTERNAL HOME HEALTH (OUTPATIENT)
Dept: HOME HEALTH SERVICES | Facility: HOSPITAL | Age: OVER 89
End: 2025-05-22
Payer: MEDICARE

## 2025-06-04 ENCOUNTER — DOCUMENT SCAN (OUTPATIENT)
Dept: HOME HEALTH SERVICES | Facility: HOSPITAL | Age: OVER 89
End: 2025-06-04
Payer: MEDICARE